# Patient Record
Sex: FEMALE | Race: BLACK OR AFRICAN AMERICAN | NOT HISPANIC OR LATINO | URBAN - METROPOLITAN AREA
[De-identification: names, ages, dates, MRNs, and addresses within clinical notes are randomized per-mention and may not be internally consistent; named-entity substitution may affect disease eponyms.]

---

## 2020-07-06 ENCOUNTER — OUTPATIENT (OUTPATIENT)
Dept: OUTPATIENT SERVICES | Facility: HOSPITAL | Age: 25
LOS: 1 days | Discharge: HOME | End: 2020-07-06

## 2020-07-07 DIAGNOSIS — F39 UNSPECIFIED MOOD [AFFECTIVE] DISORDER: ICD-10-CM

## 2022-03-13 PROBLEM — Z00.00 ENCOUNTER FOR PREVENTIVE HEALTH EXAMINATION: Status: ACTIVE | Noted: 2022-03-13

## 2022-03-15 ENCOUNTER — APPOINTMENT (OUTPATIENT)
Dept: CARDIOLOGY | Facility: CLINIC | Age: 27
End: 2022-03-15
Payer: COMMERCIAL

## 2022-03-15 ENCOUNTER — NON-APPOINTMENT (OUTPATIENT)
Age: 27
End: 2022-03-15

## 2022-03-15 VITALS
OXYGEN SATURATION: 96 % | BODY MASS INDEX: 26.05 KG/M2 | WEIGHT: 182 LBS | SYSTOLIC BLOOD PRESSURE: 118 MMHG | HEART RATE: 93 BPM | DIASTOLIC BLOOD PRESSURE: 78 MMHG | HEIGHT: 70 IN

## 2022-03-15 PROCEDURE — 93000 ELECTROCARDIOGRAM COMPLETE: CPT

## 2022-03-15 PROCEDURE — 99204 OFFICE O/P NEW MOD 45 MIN: CPT

## 2022-03-15 RX ORDER — QUETIAPINE FUMARATE 200 MG/1
200 TABLET ORAL
Refills: 0 | Status: ACTIVE | COMMUNITY

## 2022-03-15 RX ORDER — LANSOPRAZOLE 30 MG/1
30 CAPSULE, DELAYED RELEASE ORAL
Refills: 0 | Status: ACTIVE | COMMUNITY

## 2022-03-15 NOTE — REASON FOR VISIT
[FreeTextEntry1] : Ashley Hernandez 26 years old here for evaluation of palpitations and rapid heartbeat.  She recently had COVID-19

## 2022-03-15 NOTE — ASSESSMENT
[FreeTextEntry1] : Ashley Hernandez is a sinus tachycardia otherwise normal EKG for age.  She does not have findings suggestive of POTS as her heart rate does not increase dramatically with standing and she had no significant dizziness or orthostatic hypotension.  The symptoms of tachycardia seem to have exacerbated after COVID-19.\par \par Certainly there is the possibility of an occult myocarditis though I doubt this based upon her physical exam and EKG.

## 2022-03-15 NOTE — HISTORY OF PRESENT ILLNESS
[FreeTextEntry1] : Ashley is an overall healthy 26-year-old no diabetes or hypertension non-smoker.  She has a longstanding history of anxiety disorder followed by psychiatry on several medications.\par \par She recently had COVID-19 and subsequent to that had severe fatigue mild shortness of breath and is noted that her heart rate has increased significantly.  It is elevated both at rest and increases even further with any activity.  She complains of palpitations and some dizziness.\par \par She also recently started on Adderall in addition to her other medications for difficulty concentrating.  She claims that her tachycardia preceded starting the Adderall

## 2022-03-15 NOTE — DISCUSSION/SUMMARY
[FreeTextEntry1] : 1.  I tried to reassure her that her cardiac status was stable and I doubted that there was any cardiac issues to explain the tachycardia\par \par 2.  This can be seen sometimes after COVID-19 without the diagnosis of POTS\par \par 3.  Nonetheless I suggested a 2D echo to evaluate for structural heart disease.  Echo has been ordered\par \par I will follow-up with the patient after the echo

## 2022-03-15 NOTE — PHYSICAL EXAM
[Normal Conjunctiva] : normal conjunctiva [No Carotid Bruit] : no carotid bruit [Normal S1, S2] : normal S1, S2 [No Murmur] : no murmur [Clear Lung Fields] : clear lung fields [No Respiratory Distress] : no respiratory distress  [Soft] : abdomen soft [Non Tender] : non-tender [Normal Gait] : normal gait [No Edema] : no edema [Normal DP B/L] : normal DP B/L [de-identified] : No orthostatic hypotension today pulse at rest was 88 and after standing went 110 this even after 10 minutes with exercise heart rate went up to 130 beats a minute

## 2022-04-07 ENCOUNTER — APPOINTMENT (OUTPATIENT)
Dept: CARDIOLOGY | Facility: CLINIC | Age: 27
End: 2022-04-07
Payer: COMMERCIAL

## 2022-04-07 PROCEDURE — 93306 TTE W/DOPPLER COMPLETE: CPT

## 2022-05-18 ENCOUNTER — NON-APPOINTMENT (OUTPATIENT)
Age: 27
End: 2022-05-18

## 2023-01-06 ENCOUNTER — APPOINTMENT (OUTPATIENT)
Dept: CARDIOLOGY | Facility: CLINIC | Age: 28
End: 2023-01-06
Payer: COMMERCIAL

## 2023-01-06 ENCOUNTER — NON-APPOINTMENT (OUTPATIENT)
Age: 28
End: 2023-01-06

## 2023-01-06 VITALS
HEART RATE: 123 BPM | SYSTOLIC BLOOD PRESSURE: 110 MMHG | OXYGEN SATURATION: 98 % | DIASTOLIC BLOOD PRESSURE: 70 MMHG | WEIGHT: 200 LBS

## 2023-01-06 DIAGNOSIS — U07.1 COVID-19: ICD-10-CM

## 2023-01-06 PROCEDURE — 99214 OFFICE O/P EST MOD 30 MIN: CPT | Mod: 25

## 2023-01-06 PROCEDURE — 93000 ELECTROCARDIOGRAM COMPLETE: CPT

## 2023-01-06 NOTE — DISCUSSION/SUMMARY
[FreeTextEntry1] :  1.  Start metoprolol 25 mg twice a day\par  2.  Follow-up with endocrinology concerning her thyroid disease\par  3.  Hematology consult real hypercoagulable state to explain the radiology of the pulmonary embolus\par  4.  Follow-up again in 1 month [EKG obtained to assist in diagnosis and management of assessed problem(s)] : EKG obtained to assist in diagnosis and management of assessed problem(s)

## 2023-01-06 NOTE — HISTORY OF PRESENT ILLNESS
[FreeTextEntry1] : Ashley is an overall healthy 27-year-old no diabetes or hypertension non-smoker.  She has a longstanding history of anxiety disorder followed by psychiatry on several medications.\par \par She recently had COVID-19 and subsequent to that had severe fatigue mild shortness of breath and is noted that her heart rate has increased significantly.  It is elevated both at rest and increases even further with any activity.  She complains of palpitations and some dizziness.\par \par She also recently started on Adderall in addition to her other medications for difficulty concentrating.  She claims that her tachycardia preceded starting the Adderall.\par \par   2D echo July 2022 normal left ventricular function normal ejection fraction no segmental wall motion abnormality normal pulmonary pressure.  Thickened mitral valve with minimal mitral valve prolapse minimal mitral regurgitation\par \par  visit January 6, 2023: The patient was recently hospitalized at [facility] with shortness of breath sudden in onset and was diagnosed with acute pulmonary embolus.  She apparently had a repeat echo which was unremarkable and Dopplers of the lower extremities that did not reveal DVT.  I have no documentation.  The patient was initially on Eliquis 10 twice daily and presently is on 5 twice daily.  She remains quite tachycardic but apparently her thyroid function is also not in order\par \par  presently she denies significant shortness of breath but is aware of a fast heart rate\par \par  EKG reveals a sinus tachycardia otherwise unremarkable and unchanged January 6, 2023\par \par

## 2023-01-06 NOTE — ASSESSMENT
[FreeTextEntry1] : Ashley Hernandez is a sinus tachycardia otherwise normal EKG for age.  She does not have findings suggestive of POTS as her heart rate does not increase dramatically with standing and she had no significant dizziness or orthostatic hypotension.  The symptoms of tachycardia seem to have exacerbated after COVID-19.\par \par  initial evaluation did show normal echocardiogram and she was on low-dose beta-blocker.  She also has thyroid disease and had been on Adderall which she is no longer on\par .\par   She recently had an acute pulmonary embolus etiology not clear she did not have a DVT and hypercoagulable state needs to be excluded.  She remains tachycardic which could be related some degree to of the pulmonary embolus but also could be related to her thyroid disease.\par \par \par \par 1.  Sinus tachycardia\par  2.  Status postacute pulmonary emboli\par .3 Hemodynamically stable

## 2023-01-06 NOTE — PHYSICAL EXAM
[Normal Conjunctiva] : normal conjunctiva [No Carotid Bruit] : no carotid bruit [Normal S1, S2] : normal S1, S2 [No Murmur] : no murmur [Clear Lung Fields] : clear lung fields [No Respiratory Distress] : no respiratory distress  [Soft] : abdomen soft [Non Tender] : non-tender [Normal Gait] : normal gait [No Edema] : no edema [Normal DP B/L] : normal DP B/L [de-identified] :  heart rate 110 at rest anxious but no acute distress [de-identified] :  P2 is normal

## 2023-01-18 ENCOUNTER — APPOINTMENT (OUTPATIENT)
Dept: CARDIOLOGY | Facility: CLINIC | Age: 28
End: 2023-01-18
Payer: COMMERCIAL

## 2023-01-18 VITALS
HEART RATE: 120 BPM | WEIGHT: 200 LBS | SYSTOLIC BLOOD PRESSURE: 123 MMHG | OXYGEN SATURATION: 97 % | DIASTOLIC BLOOD PRESSURE: 60 MMHG

## 2023-01-18 PROCEDURE — 99213 OFFICE O/P EST LOW 20 MIN: CPT

## 2023-01-18 RX ORDER — DEXTROAMPHETAMINE SACCHARATE, AMPHETAMINE ASPARTATE, DEXTROAMPHETAMINE SULFATE, AND AMPHETAMINE SULFATE 2.5; 2.5; 2.5; 2.5 MG/1; MG/1; MG/1; MG/1
10 TABLET ORAL
Refills: 0 | Status: DISCONTINUED | COMMUNITY
End: 2023-01-18

## 2023-01-18 NOTE — HISTORY OF PRESENT ILLNESS
[FreeTextEntry1] : Ashley is an overall healthy 27-year-old no diabetes or hypertension non-smoker.  She has a longstanding history of anxiety disorder followed by psychiatry on several medications.\par \par She recently had COVID-19 and subsequent to that had severe fatigue mild shortness of breath and is noted that her heart rate has increased significantly.  It is elevated both at rest and increases even further with any activity.  She complains of palpitations and some dizziness.\par \par She also recently started on Adderall in addition to her other medications for difficulty concentrating.  She claims that her tachycardia preceded starting the Adderall.\par \par   2D echo July 2022 normal left ventricular function normal ejection fraction no segmental wall motion abnormality normal pulmonary pressure.  Thickened mitral valve with minimal mitral valve prolapse minimal mitral regurgitation\par \par  visit January 6, 2023: The patient was recently hospitalized at [facility] with shortness of breath sudden in onset and was diagnosed with acute pulmonary embolus.  She apparently had a repeat echo which was unremarkable and Dopplers of the lower extremities that did not reveal DVT.  I have no documentation.  The patient was initially on Eliquis 10 twice daily and presently is on 5 twice daily.  She remains quite tachycardic but apparently her thyroid function is also not in order\par \par  presently she denies significant shortness of breath but is aware of a fast heart rate\par \par Visit to January 18, 2023: Patient is still awaiting the work-up for a hypercoagulable state.  She also has a history of hyperthyroidism and is on either Tapazole or methimazole and is followed by endocrinology\par \par Her main complaint continues to be a tachycardia feeling her heart rate racing and chest pain and shortness of breath.  We had increased her metoprolol to 25 twice daily.\par Presently her main complaint is that she is not feeling well and feels her heart racing.\par \par  EKG reveals a sinus tachycardia otherwise unremarkable and unchanged January 6, 2023\par \par

## 2023-01-18 NOTE — PHYSICAL EXAM
[Normal Conjunctiva] : normal conjunctiva [No Carotid Bruit] : no carotid bruit [Normal S1, S2] : normal S1, S2 [No Murmur] : no murmur [Clear Lung Fields] : clear lung fields [No Respiratory Distress] : no respiratory distress  [No Edema] : no edema [Normal DP B/L] : normal DP B/L [de-identified] :  heart rate 120 at rest anxious but no acute distress [de-identified] :  P2 is normal

## 2023-01-18 NOTE — DISCUSSION/SUMMARY
[FreeTextEntry1] : 1.  I increased her metoprolol to 50 mg twice a day which can be increased further and is the right drug particularly if she is hypothyroid as a cause of her tachycardia.  The patient asked about calcium channel blockers but I told her this was not the right choice presently\par \par 2.  Follow-up in 1 month and at that point we will repeat the echo if she is still tachycardic we can increase the beta-blocker further if she can tolerate it

## 2023-01-18 NOTE — ASSESSMENT
[FreeTextEntry1] : Ashley Hernandez is a sinus tachycardia otherwise normal EKG for age.  She does not have findings suggestive of POTS as her heart rate does not increase dramatically with standing and she had no significant dizziness or orthostatic hypotension.  The symptoms of tachycardia seem to have exacerbated after COVID-19.\par \par  initial evaluation did show normal echocardiogram and she was on low-dose beta-blocker.  She also has thyroid disease and had been on Adderall which she is no longer on\par .\par   She recently had an acute pulmonary embolus etiology not clear she did not have a DVT and hypercoagulable state needs to be excluded.  She remains tachycardic which could be related some degree to of the pulmonary embolus but also could be related to her thyroid disease.\par \par \par \par 1.  Sinus tachycardia-this persists and she is symptomatic from the tachycardia.  Etiology related to anxiety and perhaps hyperthyroidism.  I do not feel is related to the pulmonary embolus that she had.\par  2.  Status postacute pulmonary emboli DVT rule out hypercoagulable state\par .3 Hemodynamically stable\par 4.  Hyperthyroidism on Tapazole of methimazole

## 2023-02-15 ENCOUNTER — APPOINTMENT (OUTPATIENT)
Dept: CARDIOLOGY | Facility: CLINIC | Age: 28
End: 2023-02-15

## 2023-06-07 ENCOUNTER — NON-APPOINTMENT (OUTPATIENT)
Age: 28
End: 2023-06-07

## 2023-06-07 ENCOUNTER — APPOINTMENT (OUTPATIENT)
Dept: CARDIOLOGY | Facility: CLINIC | Age: 28
End: 2023-06-07
Payer: COMMERCIAL

## 2023-06-07 VITALS
SYSTOLIC BLOOD PRESSURE: 130 MMHG | OXYGEN SATURATION: 97 % | HEART RATE: 88 BPM | WEIGHT: 2 LBS | DIASTOLIC BLOOD PRESSURE: 70 MMHG

## 2023-06-07 DIAGNOSIS — F41.9 ANXIETY DISORDER, UNSPECIFIED: ICD-10-CM

## 2023-06-07 DIAGNOSIS — I31.39 OTHER PERICARDIAL EFFUSION (NONINFLAMMATORY): ICD-10-CM

## 2023-06-07 DIAGNOSIS — I26.99 OTHER PULMONARY EMBOLISM W/OUT ACUTE COR PULMONALE: ICD-10-CM

## 2023-06-07 DIAGNOSIS — E05.90 THYROTOXICOSIS, UNSPECIFIED W/OUT THYROTOXIC CRISIS OR STORM: ICD-10-CM

## 2023-06-07 PROCEDURE — 93306 TTE W/DOPPLER COMPLETE: CPT

## 2023-06-07 PROCEDURE — 99214 OFFICE O/P EST MOD 30 MIN: CPT | Mod: 25

## 2023-06-07 PROCEDURE — 93000 ELECTROCARDIOGRAM COMPLETE: CPT

## 2023-06-07 RX ORDER — TRAZODONE HYDROCHLORIDE 100 MG/1
100 TABLET ORAL
Refills: 0 | Status: DISCONTINUED | COMMUNITY
End: 2023-06-07

## 2023-06-07 RX ORDER — BUPROPION HYDROCHLORIDE 300 MG/1
300 TABLET, EXTENDED RELEASE ORAL
Refills: 0 | Status: DISCONTINUED | COMMUNITY
End: 2023-06-07

## 2023-06-07 NOTE — DISCUSSION/SUMMARY
[FreeTextEntry1] : \par 1.  I do not find a cardiac cause for her shortness of breath which actually is chronic\par  2.  A tachycardia appears to be better than it has been in the past\par  3.  She has a small pericardial effusion which I do not think is hemodynamically significant but needs an echocardiogram for definitive look at the pericardium\par \par  I tried to reassure her that there was no cardiac cause for her symptoms.  I await the results of the echocardio [EKG obtained to assist in diagnosis and management of assessed problem(s)] : EKG obtained to assist in diagnosis and management of assessed problem(s)

## 2023-06-07 NOTE — ASSESSMENT
[FreeTextEntry1] : Ashley Hernandez is a sinus tachycardia otherwise normal EKG for age.  She does not have findings suggestive of POTS as her heart rate does not increase dramatically with standing and she had no significant dizziness or orthostatic hypotension.  The symptoms of tachycardia seem to have exacerbated after COVID-19.\par \par  initial evaluation did show normal echocardiogram and she was on low-dose beta-blocker.  She also has thyroid disease and had been on Adderall which she is no longer on\par .\par   She recently had an acute pulmonary embolus etiology not clear she did not have a DVT and hypercoagulable state needs to be excluded.  She remains tachycardic which could be related some degree to of the pulmonary embolus but also could be related to her thyroid disease.\par \par  the patient has continued to have exertional shortness of breath and palpitations though physical exam is normal saturations are normal and her heart rate response is appropriate and she is not as tachycardic on metoprolol 25 twice a day.  CT was reported as showing a small pericardial effusion which prompted the visit today\par \par \par \par 1.  Sinus tachycardia-this persists  but does not appear to be as significant as on previous visits\par 2. Hemodynamically stable no pulses\par 3.  Hyperthyroidism on Tapazole of methimazole\par 4.Small pericardial effusion  CT scan no evidence of tamponade clinically.

## 2023-06-07 NOTE — REASON FOR VISIT
[FreeTextEntry1] : Ashley mcleod Was seen urgently for evaluation of progressive shortness of breath and an abnormal CT scan performed at [facility] which revealed no pulmonary embolus normal lung fields and a small pericardial effusion.  She was referred urgently for evaluation of the pericardial effusion

## 2023-06-07 NOTE — PHYSICAL EXAM
[Normal Conjunctiva] : normal conjunctiva [No Carotid Bruit] : no carotid bruit [Normal S1, S2] : normal S1, S2 [No Murmur] : no murmur [Clear Lung Fields] : clear lung fields [No Respiratory Distress] : no respiratory distress  [No Edema] : no edema [Normal DP B/L] : normal DP B/L [de-identified] :  heart rate is 95 and increase slightly with exercise.  Her oxygen saturation was 96% both at rest and with walking vigorously back-and-forth.  There was no pulses paradoxus [de-identified] :  no neck vein distention [de-identified] :  P2 is normal

## 2023-06-07 NOTE — HISTORY OF PRESENT ILLNESS
[FreeTextEntry1] : Ashley is an overall healthy 28-year-old no diabetes or hypertension non-smoker.  She has a longstanding history of anxiety disorder followed by psychiatry on several medications.\par \par She  had COVID-19 and subsequent to that had severe fatigue mild shortness of breath and is noted that her heart rate has increased significantly.  It is elevated both at rest and increases even further with any activity.  She complains of palpitations and some dizziness.\par \par She also recently started on Adderall in addition to her other medications for difficulty concentrating.  She claims that her tachycardia preceded starting the Adderall.\par \par   2D echo July 2022 normal left ventricular function normal ejection fraction no segmental wall motion abnormality normal pulmonary pressure.  Thickened mitral valve with minimal mitral valve prolapse minimal mitral regurgitation\par \par  visit January 6, 2023: The patient was recently hospitalized at [facility] with shortness of breath sudden in onset and was diagnosed with acute pulmonary embolus.  She apparently had a repeat echo which was unremarkable and Dopplers of the lower extremities that did not reveal DVT.  I have no documentation.  The patient was initially on Eliquis 10 twice daily and presently is on 5 twice daily.  She remains quite tachycardic but apparently her thyroid function is also not in order\par \par  presently she denies significant shortness of breath but is aware of a fast heart rate\par \par Visit to January 18, 2023: Patient is still awaiting the work-up for a hypercoagulable state.  She also has a history of hyperthyroidism and is on either Tapazole or methimazole and is followed by endocrinology\par \par Her main complaint continues to be a tachycardia feeling her heart rate racing and chest pain and shortness of breath.  We had increased her metoprolol to 25 twice daily.\par Presently her main complaint is that she is not feeling well and feels her heart racing.\par \par  EKG reveals a sinus tachycardia otherwise unremarkable and unchanged January 6, 2023\par \par  visit June 7, 2023: The patient has continued to be short of breath and now feels that she is more short of breath with any kind of exertion.  She also feels her heart racing.  She has been followed by a pulmonologist who ordered a CT of the chest with contrast.  There was no evidence of pulmonary embolus the lung fields were clear and it was read as showing a trace pericardial effusion.  She was referred urgently to see me to see whether the pericardial fusion was playing a role in her shortness of breath\par \par  EKG June 7, 2023 normal sinus rhythm heart rate 90-95 within normal limits no ST elevation no ST depression no CO depression\par \par

## 2023-08-23 ENCOUNTER — EMERGENCY (EMERGENCY)
Facility: HOSPITAL | Age: 28
LOS: 1 days | Discharge: HOME | End: 2023-08-23
Attending: STUDENT IN AN ORGANIZED HEALTH CARE EDUCATION/TRAINING PROGRAM | Admitting: STUDENT IN AN ORGANIZED HEALTH CARE EDUCATION/TRAINING PROGRAM
Payer: COMMERCIAL

## 2023-08-23 VITALS
SYSTOLIC BLOOD PRESSURE: 116 MMHG | RESPIRATION RATE: 20 BRPM | DIASTOLIC BLOOD PRESSURE: 70 MMHG | HEART RATE: 20 BPM | OXYGEN SATURATION: 98 % | TEMPERATURE: 98 F

## 2023-08-23 VITALS
OXYGEN SATURATION: 100 % | SYSTOLIC BLOOD PRESSURE: 131 MMHG | RESPIRATION RATE: 17 BRPM | HEART RATE: 68 BPM | DIASTOLIC BLOOD PRESSURE: 73 MMHG | TEMPERATURE: 99 F

## 2023-08-23 LAB
ALBUMIN SERPL ELPH-MCNC: 4.7 G/DL — SIGNIFICANT CHANGE UP (ref 3.3–5)
ALP SERPL-CCNC: 233 U/L — HIGH (ref 40–120)
ALT FLD-CCNC: 27 U/L — SIGNIFICANT CHANGE UP (ref 4–33)
ANION GAP SERPL CALC-SCNC: 13 MMOL/L — SIGNIFICANT CHANGE UP (ref 7–14)
APTT BLD: 28.1 SEC — SIGNIFICANT CHANGE UP (ref 24.5–35.6)
AST SERPL-CCNC: 22 U/L — SIGNIFICANT CHANGE UP (ref 4–32)
BASOPHILS # BLD AUTO: 0.03 K/UL — SIGNIFICANT CHANGE UP (ref 0–0.2)
BASOPHILS NFR BLD AUTO: 0.3 % — SIGNIFICANT CHANGE UP (ref 0–2)
BILIRUB SERPL-MCNC: 0.3 MG/DL — SIGNIFICANT CHANGE UP (ref 0.2–1.2)
BUN SERPL-MCNC: 8 MG/DL — SIGNIFICANT CHANGE UP (ref 7–23)
CALCIUM SERPL-MCNC: 9.7 MG/DL — SIGNIFICANT CHANGE UP (ref 8.4–10.5)
CHLORIDE SERPL-SCNC: 103 MMOL/L — SIGNIFICANT CHANGE UP (ref 98–107)
CO2 SERPL-SCNC: 23 MMOL/L — SIGNIFICANT CHANGE UP (ref 22–31)
CREAT SERPL-MCNC: 0.81 MG/DL — SIGNIFICANT CHANGE UP (ref 0.5–1.3)
EGFR: 103 ML/MIN/1.73M2 — SIGNIFICANT CHANGE UP
EOSINOPHIL # BLD AUTO: 0.12 K/UL — SIGNIFICANT CHANGE UP (ref 0–0.5)
EOSINOPHIL NFR BLD AUTO: 1.3 % — SIGNIFICANT CHANGE UP (ref 0–6)
GLUCOSE SERPL-MCNC: 76 MG/DL — SIGNIFICANT CHANGE UP (ref 70–99)
HCT VFR BLD CALC: 46 % — HIGH (ref 34.5–45)
HGB BLD-MCNC: 14.9 G/DL — SIGNIFICANT CHANGE UP (ref 11.5–15.5)
IANC: 4.15 K/UL — SIGNIFICANT CHANGE UP (ref 1.8–7.4)
IMM GRANULOCYTES NFR BLD AUTO: 0.2 % — SIGNIFICANT CHANGE UP (ref 0–0.9)
INR BLD: 1 RATIO — SIGNIFICANT CHANGE UP (ref 0.85–1.18)
LYMPHOCYTES # BLD AUTO: 4.5 K/UL — HIGH (ref 1–3.3)
LYMPHOCYTES # BLD AUTO: 47.9 % — HIGH (ref 13–44)
MCHC RBC-ENTMCNC: 28.9 PG — SIGNIFICANT CHANGE UP (ref 27–34)
MCHC RBC-ENTMCNC: 32.4 GM/DL — SIGNIFICANT CHANGE UP (ref 32–36)
MCV RBC AUTO: 89.3 FL — SIGNIFICANT CHANGE UP (ref 80–100)
MONOCYTES # BLD AUTO: 0.57 K/UL — SIGNIFICANT CHANGE UP (ref 0–0.9)
MONOCYTES NFR BLD AUTO: 6.1 % — SIGNIFICANT CHANGE UP (ref 2–14)
NEUTROPHILS # BLD AUTO: 4.15 K/UL — SIGNIFICANT CHANGE UP (ref 1.8–7.4)
NEUTROPHILS NFR BLD AUTO: 44.2 % — SIGNIFICANT CHANGE UP (ref 43–77)
NRBC # BLD: 0 /100 WBCS — SIGNIFICANT CHANGE UP (ref 0–0)
NRBC # FLD: 0 K/UL — SIGNIFICANT CHANGE UP (ref 0–0)
PLATELET # BLD AUTO: 269 K/UL — SIGNIFICANT CHANGE UP (ref 150–400)
POTASSIUM SERPL-MCNC: 4.3 MMOL/L — SIGNIFICANT CHANGE UP (ref 3.5–5.3)
POTASSIUM SERPL-SCNC: 4.3 MMOL/L — SIGNIFICANT CHANGE UP (ref 3.5–5.3)
PROT SERPL-MCNC: 7.5 G/DL — SIGNIFICANT CHANGE UP (ref 6–8.3)
PROTHROM AB SERPL-ACNC: 11.2 SEC — SIGNIFICANT CHANGE UP (ref 9.5–13)
RBC # BLD: 5.15 M/UL — SIGNIFICANT CHANGE UP (ref 3.8–5.2)
RBC # FLD: 13.2 % — SIGNIFICANT CHANGE UP (ref 10.3–14.5)
SODIUM SERPL-SCNC: 139 MMOL/L — SIGNIFICANT CHANGE UP (ref 135–145)
TROPONIN T, HIGH SENSITIVITY RESULT: <6 NG/L — SIGNIFICANT CHANGE UP
WBC # BLD: 9.39 K/UL — SIGNIFICANT CHANGE UP (ref 3.8–10.5)
WBC # FLD AUTO: 9.39 K/UL — SIGNIFICANT CHANGE UP (ref 3.8–10.5)

## 2023-08-23 PROCEDURE — 71275 CT ANGIOGRAPHY CHEST: CPT | Mod: 26,MA

## 2023-08-23 PROCEDURE — 99285 EMERGENCY DEPT VISIT HI MDM: CPT

## 2023-08-23 PROCEDURE — 93010 ELECTROCARDIOGRAM REPORT: CPT

## 2023-08-23 NOTE — ED PROVIDER NOTE - ATTENDING APP SHARED VISIT CONTRIBUTION OF CARE
I have personally performed a face to face medical and diagnostic evaluation of the patient. I have discussed with and reviewed the TOMÁS's note and agree with the History, ROS, Physical Exam and MDM unless otherwise indicated. A brief summary of my personal evaluation and impression can be found below.    Sloan GARCIA: Please see the HPI, ROS, PE and MDM as authored by me.

## 2023-08-23 NOTE — ED ADULT NURSE NOTE - NSFALLUNIVINTERV_ED_ALL_ED
Bed/Stretcher in lowest position, wheels locked, appropriate side rails in place/Call bell, personal items and telephone in reach/Instruct patient to call for assistance before getting out of bed/chair/stretcher/Non-slip footwear applied when patient is off stretcher/[place] to call system/Physically safe environment - no spills, clutter or unnecessary equipment/Purposeful proactive rounding/Room/bathroom lighting operational, light cord in reach

## 2023-08-23 NOTE — ED PROVIDER NOTE - OBJECTIVE STATEMENT
Sloan GARCIA: 28-year-old female, history of prior PEs thought secondary to birth control, currently off AC since June was formally on Eliquis, also has a history of hypothyroidism tachycardia, presents with a chief complaint of worsening shortness of breath and dyspnea on exertion over the last week now associated with diffuse but worsening left-sided chest tightness this feels consistent with her prior episodes of PE, she was seen by her outpatient pulmonologist and was encouraged to come to the ED for evaluation for a CT of her chest, no nausea no vomiting no fever, no urinary or bowel complaints, no new lower extremity swelling or edema, no cough no hemoptysis.  She reports she may have a history of exercise-induced asthma however she does not feel this is contributing to her symptoms.

## 2023-08-23 NOTE — ED PROVIDER NOTE - CLINICAL SUMMARY MEDICAL DECISION MAKING FREE TEXT BOX
Sloan GARCIA: Exam vital signs stable nontoxic-appearing physical exam as above, DDx concern for possible PE given age and risk factors and history, less concern for primary ACS, her EKG is reassuring, will check basic labs cardiac enzymes will get CTA, will give additional meds as needed and reassess, low concern for DVT this time as there is no appreciable lower extremity swelling.

## 2023-08-23 NOTE — ED ADULT NURSE REASSESSMENT NOTE - NS ED NURSE REASSESS COMMENT FT1
Patient A&Ox4, respirations even and unlabored, vitals stable. Pt normal sinus on cardiac monitor. Pt states slight improvement in condition. Awaiting CT scan.

## 2023-08-23 NOTE — ED ADULT TRIAGE NOTE - CHIEF COMPLAINT QUOTE
states" I am having SOB for few weeks and getting worst with chest pain since few days" h/o PE , patient came off anticoagulants in June of this year., h/o Graves disease  takes [id number] daily ,Rexulti, Doxepin, Lansoprazole  Glycopyrrolate 2mg, daily

## 2023-08-23 NOTE — ED ADULT NURSE NOTE - OBJECTIVE STATEMENT
Pt received to int 3 , awake and alert, A&OX4, ambulatory. C/o chest pain and VAUGHN x1 week. States the SOB has become more significant today. HX of PEs. Recently stopped taking Eliquis and no longer taking oral contraceptives. Sent to ED by pulmonologist. Respirations even and unlabored. Denies CP, SOB, N/V, HA, dizziness, palpitations, blurry vision. 20G IV placed to  R lower forearm. Bed in lowest position, call bell within reach. Safety maintained.

## 2023-08-23 NOTE — ED ADULT NURSE NOTE - CHIEF COMPLAINT QUOTE
states" I am having SOB for few weeks and getting worst with chest pain since few days" h/o PE , patient came off anticoagulants in June of this year., h/o Graves disease  takes [id number] daily ,Rexulti, Doxepin, Lansoprazole  Glycopyrrolate 2mg, daily

## 2023-08-23 NOTE — ED PROVIDER NOTE - NSFOLLOWUPINSTRUCTIONS_ED_ALL_ED_FT
FOLLOW UP WITH CARDIOLOGIST AND/ OR YOUR PRIMARY MEDICAL DOCTOR IN 2-3 DAYS OR WITHIN THE WEEK. SHOW COPIES OF YOUR RESULTS/REPORTS GIVEN TO YOU.     Take all of your other medications as previously prescribed.     Worsening or continued chest pain, shortness of breath, weakness, return to ED.

## 2023-08-23 NOTE — ED PROVIDER NOTE - PATIENT PORTAL LINK FT
You can access the FollowMyHealth Patient Portal offered by [facility] by registering at the following website: [email or web address]. By joining [employer]’s FollowMyHealth portal, you will also be able to view your health information using other applications (apps) compatible with our system.

## 2023-08-23 NOTE — ED PROVIDER NOTE - NS ED ATTENDING STATEMENT MOD
This was a shared visit with the TOMÁS. I reviewed and verified the documentation and independently performed the documented:

## 2023-08-23 NOTE — ED PROVIDER NOTE - PHYSICAL EXAMINATION
Sloan GARCIA:  VITALS: Initial triage and subsequent vitals have been reviewed by me.  GEN APPEARANCE: Alert, cooperative. Non-toxic appearing. Well appearing. NAD.  HEAD: Atraumatic, normocephalic   EYES: PERRLa, EOMI, vision grossly intact.   EARS: Gross hearing intact.   NOSE: No nasal discharge, no external evidence of epistaxis.   NECK: Supple  CV: RRR, S1S2, no c/r/m/g. No cyanosis. Extremities warm, well perfused. Cap refill <2 seconds. No bruits.   LUNGS: CTAB. No wheezing. No rales. No rhonchi. No diminished breath sounds.   ABDOMEN: Soft, NTND. No guarding or rebound. No masses.   MSK/EXT: Spine appears normal, no spine point tenderness. No CVA ttp. Normal muscular development. Pelvis stable. No obvious joint or bony deformity, no peripheral edema.   NEURO: Alert, follows commands. Weight bearing normal. Speech normal. Sensation and motor normal x4 extremities.   SKIN: Normal color for race, warm, dry and intact. No evidence of rash.  PSYCH: Normal mood and affect.

## 2023-08-23 NOTE — ED PROVIDER NOTE - PROGRESS NOTE DETAILS
GAURANG Galvan: patient signed out to me follow up CT for PE, no PE, patient is clinically well and vitally stable for dc

## 2023-10-03 ENCOUNTER — APPOINTMENT (OUTPATIENT)
Dept: CARDIOLOGY | Facility: CLINIC | Age: 28
End: 2023-10-03

## 2023-10-14 ENCOUNTER — EMERGENCY (EMERGENCY)
Facility: HOSPITAL | Age: 28
LOS: 1 days | Discharge: HOME | End: 2023-10-14
Attending: STUDENT IN AN ORGANIZED HEALTH CARE EDUCATION/TRAINING PROGRAM
Payer: COMMERCIAL

## 2023-10-14 VITALS
DIASTOLIC BLOOD PRESSURE: 65 MMHG | SYSTOLIC BLOOD PRESSURE: 93 MMHG | HEIGHT: 69 IN | WEIGHT: 216.93 LBS | OXYGEN SATURATION: 99 % | HEART RATE: 95 BPM | RESPIRATION RATE: 22 BRPM | TEMPERATURE: 98 F

## 2023-10-14 VITALS
OXYGEN SATURATION: 98 % | TEMPERATURE: 98 F | RESPIRATION RATE: 16 BRPM | DIASTOLIC BLOOD PRESSURE: 67 MMHG | HEART RATE: 82 BPM | SYSTOLIC BLOOD PRESSURE: 104 MMHG

## 2023-10-14 LAB
ALBUMIN SERPL ELPH-MCNC: 4 G/DL — SIGNIFICANT CHANGE UP (ref 3.3–5)
ALP SERPL-CCNC: 165 U/L — HIGH (ref 40–120)
ALT FLD-CCNC: 23 U/L — SIGNIFICANT CHANGE UP (ref 10–45)
ANION GAP SERPL CALC-SCNC: 12 MMOL/L — SIGNIFICANT CHANGE UP (ref 5–17)
APTT BLD: 27.4 SEC — SIGNIFICANT CHANGE UP (ref 24.5–35.6)
AST SERPL-CCNC: 14 U/L — SIGNIFICANT CHANGE UP (ref 10–40)
BILIRUB SERPL-MCNC: 0.4 MG/DL — SIGNIFICANT CHANGE UP (ref 0.2–1.2)
BLD GP AB SCN SERPL QL: NEGATIVE — SIGNIFICANT CHANGE UP
BUN SERPL-MCNC: 10 MG/DL — SIGNIFICANT CHANGE UP (ref 7–23)
CALCIUM SERPL-MCNC: 9.3 MG/DL — SIGNIFICANT CHANGE UP (ref 8.4–10.5)
CHLORIDE SERPL-SCNC: 107 MMOL/L — SIGNIFICANT CHANGE UP (ref 96–108)
CO2 SERPL-SCNC: 21 MMOL/L — LOW (ref 22–31)
CREAT SERPL-MCNC: 0.77 MG/DL — SIGNIFICANT CHANGE UP (ref 0.5–1.3)
EGFR: 109 ML/MIN/1.73M2 — SIGNIFICANT CHANGE UP
GLUCOSE SERPL-MCNC: 103 MG/DL — HIGH (ref 70–99)
HCG SERPL-ACNC: <2 MIU/ML — SIGNIFICANT CHANGE UP
HCT VFR BLD CALC: 40.5 % — SIGNIFICANT CHANGE UP (ref 34.5–45)
HGB BLD-MCNC: 13.6 G/DL — SIGNIFICANT CHANGE UP (ref 11.5–15.5)
INR BLD: 1.12 RATIO — SIGNIFICANT CHANGE UP (ref 0.85–1.18)
MCHC RBC-ENTMCNC: 30.4 PG — SIGNIFICANT CHANGE UP (ref 27–34)
MCHC RBC-ENTMCNC: 33.6 GM/DL — SIGNIFICANT CHANGE UP (ref 32–36)
MCV RBC AUTO: 90.6 FL — SIGNIFICANT CHANGE UP (ref 80–100)
NRBC # BLD: 0 /100 WBCS — SIGNIFICANT CHANGE UP (ref 0–0)
PLATELET # BLD AUTO: 203 K/UL — SIGNIFICANT CHANGE UP (ref 150–400)
POTASSIUM SERPL-MCNC: 3.9 MMOL/L — SIGNIFICANT CHANGE UP (ref 3.5–5.3)
POTASSIUM SERPL-SCNC: 3.9 MMOL/L — SIGNIFICANT CHANGE UP (ref 3.5–5.3)
PROT SERPL-MCNC: 6.6 G/DL — SIGNIFICANT CHANGE UP (ref 6–8.3)
PROTHROM AB SERPL-ACNC: 12.3 SEC — SIGNIFICANT CHANGE UP (ref 9.5–13)
RBC # BLD: 4.47 M/UL — SIGNIFICANT CHANGE UP (ref 3.8–5.2)
RBC # FLD: 12.4 % — SIGNIFICANT CHANGE UP (ref 10.3–14.5)
RH IG SCN BLD-IMP: POSITIVE — SIGNIFICANT CHANGE UP
SODIUM SERPL-SCNC: 140 MMOL/L — SIGNIFICANT CHANGE UP (ref 135–145)
WBC # BLD: 9.77 K/UL — SIGNIFICANT CHANGE UP (ref 3.8–10.5)
WBC # FLD AUTO: 9.77 K/UL — SIGNIFICANT CHANGE UP (ref 3.8–10.5)

## 2023-10-14 PROCEDURE — 85027 COMPLETE CBC AUTOMATED: CPT

## 2023-10-14 PROCEDURE — 86900 BLOOD TYPING SEROLOGIC ABO: CPT

## 2023-10-14 PROCEDURE — 96374 THER/PROPH/DIAG INJ IV PUSH: CPT | Mod: XU

## 2023-10-14 PROCEDURE — 85730 THROMBOPLASTIN TIME PARTIAL: CPT

## 2023-10-14 PROCEDURE — 84702 CHORIONIC GONADOTROPIN TEST: CPT

## 2023-10-14 PROCEDURE — 86901 BLOOD TYPING SEROLOGIC RH(D): CPT

## 2023-10-14 PROCEDURE — 80053 COMPREHEN METABOLIC PANEL: CPT

## 2023-10-14 PROCEDURE — 99285 EMERGENCY DEPT VISIT HI MDM: CPT

## 2023-10-14 PROCEDURE — 85610 PROTHROMBIN TIME: CPT

## 2023-10-14 PROCEDURE — 99284 EMERGENCY DEPT VISIT MOD MDM: CPT | Mod: 25

## 2023-10-14 PROCEDURE — 72193 CT PELVIS W/DYE: CPT | Mod: MA

## 2023-10-14 PROCEDURE — 86850 RBC ANTIBODY SCREEN: CPT

## 2023-10-14 PROCEDURE — 72193 CT PELVIS W/DYE: CPT | Mod: 26,MA

## 2023-10-14 RX ORDER — SODIUM CHLORIDE 9 MG/ML
1000 INJECTION INTRAMUSCULAR; INTRAVENOUS; SUBCUTANEOUS ONCE
Refills: 0 | Status: COMPLETED | OUTPATIENT
Start: 2023-10-14 | End: 2023-10-14

## 2023-10-14 RX ORDER — FENTANYL CITRATE 50 UG/ML
25 INJECTION INTRAVENOUS ONCE
Refills: 0 | Status: DISCONTINUED | OUTPATIENT
Start: 2023-10-14 | End: 2023-10-14

## 2023-10-14 RX ORDER — OXYCODONE AND ACETAMINOPHEN 5; 325 MG/1; MG/1
2 TABLET ORAL ONCE
Refills: 0 | Status: DISCONTINUED | OUTPATIENT
Start: 2023-10-14 | End: 2023-10-14

## 2023-10-14 RX ADMIN — Medication 300 MILLIGRAM(S): at 06:27

## 2023-10-14 RX ADMIN — FENTANYL CITRATE 25 MICROGRAM(S): 50 INJECTION INTRAVENOUS at 05:38

## 2023-10-14 RX ADMIN — SODIUM CHLORIDE 1000 MILLILITER(S): 9 INJECTION INTRAMUSCULAR; INTRAVENOUS; SUBCUTANEOUS at 05:36

## 2023-10-14 NOTE — ED PROVIDER NOTE - ATTENDING CONTRIBUTION TO CARE
I, Cleveland Kidd, performed a history and physical exam of the patient and discussed their management with the resident and/or advanced care provider. I reviewed the resident and/or advanced care provider's note and agree with the documented findings and plan of care. I was present and available for all procedures.    Patient presenting with left-sided buttock swelling for 1 day causing pain denies fevers chills rectal complaints discharge bug bites trauma to area    Gen: Well appearing and in NAD  Head: normal appearing atraumatic   Neck: trachea midline  Resp:  No respiratory distress  Abd; soft NT ND  Ext: no visible deformities  L sided buttocks swelling induration ttp no crepitus no drainage   Neuro:  Alert and oriented, appears non focal  Skin:  Warm and dry as visualized  Psych:  Normal affect and mood    Patient presenting with significant cellulitis likely abscess with pocket needing drainage otherwise no systemic signs and symptoms we will send screening labs otherwise CT scan evaluate depth possible perianal perirectal abscess fistula needs drainage antibiotics discussed patient agreeable plan disposition pending work-up and reevaluation possible surgical consultation

## 2023-10-14 NOTE — ED PROVIDER NOTE - PHYSICAL EXAMINATION
GENERAL: well appearing in no acute distress, non-toxic appearing  CARDIAC: regular rate and rhythm, normal S1S2, no appreciable murmurs, 2+ pulses in UE/LE b/l  PULM: normal breath sounds, clear to ascultation bilaterally, no rales, rhonchi, wheezing  GI: abdomen nondistended, soft, nontender, no guarding, rebound tenderness  : no suprapubic tenderness  SKIN: palpable mass to intergluteal cleft on the L no overlying skin changes GENERAL: well appearing in no acute distress, non-toxic appearing  CARDIAC: regular rate and rhythm, normal S1S2, no appreciable murmurs, 2+ pulses in UE/LE b/l  PULM: normal breath sounds, clear to ascultation bilaterally, no rales, rhonchi, wheezing  GI: abdomen nondistended, soft, nontender, no guarding, rebound tenderness  : no suprapubic tenderness  SKIN: palpable mass to perirectal area with surrounding erythema and induration, no drainage

## 2023-10-14 NOTE — ED PROVIDER NOTE - PATIENT PORTAL LINK FT
You can access the FollowMyHealth Patient Portal offered by [facility] by registering at the following website: [email or web address]. By joining [employer]’s FollowMyHealth portal, you will also be able to view your health information using other applications (apps) compatible with our system. You can access the FollowMyHealth Patient Portal offered by [facility] by registering at the following website: [email or web address]. By joining [employer]’s FollowMyHealth portal, you will also be able to view your health information using other applications (apps) compatible with our system. You can access the FollowMyHealth Patient Portal offered by [facility] by registering at the following website: [email or web address]. By joining [employer]’s FollowMyHealth portal, you will also be able to view your health information using other applications (apps) compatible with our system.

## 2023-10-14 NOTE — ED PROVIDER NOTE - CLINICAL SUMMARY MEDICAL DECISION MAKING FREE TEXT BOX
CT to eval for fistula, likely pilonidal cyst, will pain control and refer to surgery CT to eval fistula, perirectal abscess w surrounding erythema CT to eval fistula, perirectal abscess w surrounding erythema    pettet attending- see attending attestation for my mdm

## 2023-10-14 NOTE — ED ADULT NURSE NOTE - NSFALLUNIVINTERV_ED_ALL_ED
Bed/Stretcher in lowest position, wheels locked, appropriate side rails in place/Call bell, personal items and telephone in reach/[place] to call system/Physically safe environment - no spills, clutter or unnecessary equipment/Purposeful proactive rounding/Room/bathroom lighting operational, light cord in reach Bed/Stretcher in lowest position, wheels locked, appropriate side rails in place/Call bell, personal items and telephone in reach/[place] to call system/Physically safe environment - no spills, clutter or unnecessary equipment/Purposeful proactive rounding/Room/bathroom lighting operational, light cord in reach Bed/Stretcher in lowest position, wheels locked, appropriate side rails in place/Call bell, personal items and telephone in reach/[place] to call system/Physically safe environment - no spills, clutter or unnecessary equipment/Purposeful proactive rounding/Room/bathroom lighting operational, light cord in reach

## 2023-10-14 NOTE — ED ADULT NURSE REASSESSMENT NOTE - NS ED NURSE REASSESS COMMENT FT1
Report received from DAISY Pereira. Pt A&Ox4, breathing spontaneously and unlabored on room air, moving all extremities easily, is ambulatory. Pt stable for DC per MD Nettles.

## 2023-10-14 NOTE — ED ADULT NURSE NOTE - SUICIDE SCREENING QUESTION 2
Problem: Communication  Goal: The ability to communicate needs accurately and effectively will improve  Outcome: PROGRESSING AS EXPECTED  Verbalized plan of care and educated pt on medications. Pt has no further questions at this time.    Problem: Safety  Goal: Will remain free from injury  Outcome: PROGRESSING AS EXPECTED  Bedside table and call light are within reach. Bed is locked and in the lowest position. Treaded socks are on. Patient educated to call for assistance       No

## 2023-10-14 NOTE — ED PROVIDER NOTE - OBJECTIVE STATEMENT
29 yo f hx of recent thyroidectomy here for one week of cyst on tailbone. patient says this started about a week ago with a small cyst which has grown in size and caused significant pain. has never had this before, but does have a family history of pilonidal cyst. denies fevers, chills, abdominal pain, diarrhea, dysuria.

## 2023-10-14 NOTE — ED ADULT NURSE NOTE - OBJECTIVE STATEMENT
29 y/o F with PMH of Graves disease and thyroid removal less than a month ago. Pt presents to the ED c/o cyst on tailbone since Monday. Pt reports cyst has become increasingly "red, swollen, hard to touch and painful." Pt reports pain travels down both legs. Pt reports taking Ibuprofen for pain with mild relief in pain yesterday; reports no relief in pain today; last dose at 1 am. Denies SOB, n/v/d, dizziness, fever, chills. Patient safety and comfort measures implemented.

## 2023-10-14 NOTE — ED PROVIDER NOTE - NSFOLLOWUPINSTRUCTIONS_ED_ALL_ED_FT
1. TAKE ALL MEDICATIONS AS DIRECTED.    2. FOR PAIN OR FEVER YOU CAN TAKE IBUPROFEN (MOTRIN, ADVIL) OR ACETAMINOPHEN (TYLENOL) AS NEEDED, AS DIRECTED ON PACKAGING.  3. FOLLOW UP WITH YOUR PRIMARY DOCTOR WITHIN 5 DAYS AS DIRECTED.  4. IF YOU HAD LABS OR IMAGING DONE, YOU WERE GIVEN COPIES OF ALL LABS AND/OR IMAGING RESULTS FROM YOUR ER VISIT--PLEASE TAKE THEM WITH YOU TO YOUR FOLLOW UP APPOINTMENTS.  5. RETURN TO THE ER FOR ANY WORSENING SYMPTOMS OR CONCERNS.    Abscess    An abscess is an infected area that contains a collection of pus and debris. It can occur in almost any part of the body and occurs when the tissue gets infection. Symptoms include a painful mass that is red, warm, tender that might break open and HAVE drainage. If your health care provider gave you antibiotics make sure to take the full course and do not stop even if feeling better.     SEEK IMMEDIATE MEDICAL CARE IF YOU HAVE ANY OF THE FOLLOWING SYMPTOMS: chills, fever, muscle aches, or red streaking from the area.    Skin Abscess     A skin abscess is an infected area on or under your skin that contains a collection of pus and other material. An abscess may also be called a furuncle, carbuncle, or boil. An abscess can occur in or on almost any part of your body.    Some abscesses break open (rupture) on their own. Most continue to get worse unless they are treated. The infection can spread deeper into the body and eventually into your blood, which can make you feel ill. Treatment usually involves draining the abscess.    What are the causes?    An abscess occurs when germs, like bacteria, pass through your skin and cause an infection. This may be caused by:  A scrape or cut on your skin.  A puncture wound through your skin, including a needle injection or insect bite .Blocked oil or sweat glands. Blocked and infected hair follicles.   A cyst that forms beneath your skin (sebaceous cyst) and becomes infected.    What increases the risk?  This condition is more likely to develop in people who:  Have a weak body defense system (immune system).Have diabetes.  Have dry and irritated skin.  Get frequent injections or use illegal IV drugs.  Have a foreign body in a wound, such as a splinter.  Have problems with their lymph system or veins.    What are the signs or symptoms?  Symptoms of this condition include:  A painful, firm bump under the skin.A bump with pus at the top. This may break through the skin and drain.    Other symptoms include:  Redness surrounding the abscess site.Warmth.Swelling of the lymph nodes (glands) near the abscess.Tenderness. A sore on the skin.How is this diagnosed?    This condition may be diagnosed based on:  A physical exam.Your medical history.A sample of pus. This may be used to find out what is causing the infection.Blood tests.Imaging tests, such as an ultrasound, CT scan, or MRI. How is this treated?  A small abscess that drains on its own may not need treatment. Treatment for larger abscesses may include:  Moist heat or heat pack applied to the area several times a day.A procedure to drain the abscess (incision and drainage).Antibiotic medicines. For a severe abscess, you may first get antibiotics through an IV and then change to antibiotics by mouth.Follow these instructions at home:    Medicines     Take over-the-counter and prescription medicines only as told by your health care provider.If you were prescribed an antibiotic medicine, take it as told by your health care provider. Do not stop taking the antibiotic even if you start to feel better.Abscess care      If you have an abscess that has not drained, apply heat to the affected area. Use the heat source that your health care provider recommends, such as a moist heat pack or a heating pad.    Place a towel between your skin and the heat source.Leave the heat on for 20–30 minutes.Remove the heat if your skin turns bright red. This is especially important if you are unable to feel pain, heat, or cold. You may have a greater risk of getting burned.Follow instructions from your health care provider about how to take care of your abscess. Make sure you:    Cover the abscess with a bandage (dressing).Change your dressing or gauze as told by your health care provider.Wash your hands with soap and water before you change the dressing or gauze. If soap and water are not available, use hand .Check your abscess every day for signs of a worsening infection. Check for:    More redness, swelling, or pain.More fluid or blood.Warmth.More pus or a bad smell.General instructions     To avoid spreading the infection:  Do not share personal care items, towels, or hot tubs with others.Avoid making skin contact with other people.Keep all follow-up visits as told by your health care provider. This is important.Contact a health care provider if you have:    More redness, swelling, or pain around your abscess.More fluid or blood coming from your abscess.Warm skin around your abscess.More pus or a bad smell coming from your abscess.A fever.Muscle aches.Chills or a general ill feeling.Get help right away if you:    Have severe pain.See red streaks on your skin spreading away from the abscess.Summary  A skin abscess is an infected area on or under your skin that contains a collection of pus and other material.A small abscess that drains on its own may not need treatment.Treatment for larger abscesses may include having a procedure to drain the abscess and taking an antibiotic.This information is not intended to replace advice given to you by your health care provider. Make sure you discuss any questions you have with your health care provider.

## 2023-10-16 ENCOUNTER — APPOINTMENT (OUTPATIENT)
Dept: SURGERY | Facility: CLINIC | Age: 28
End: 2023-10-16
Payer: COMMERCIAL

## 2023-10-16 VITALS
TEMPERATURE: 96.7 F | WEIGHT: 217 LBS | HEIGHT: 69 IN | SYSTOLIC BLOOD PRESSURE: 107 MMHG | DIASTOLIC BLOOD PRESSURE: 70 MMHG | OXYGEN SATURATION: 96 % | BODY MASS INDEX: 32.14 KG/M2 | HEART RATE: 96 BPM | RESPIRATION RATE: 18 BRPM

## 2023-10-16 DIAGNOSIS — Z87.01 PERSONAL HISTORY OF PNEUMONIA (RECURRENT): ICD-10-CM

## 2023-10-16 DIAGNOSIS — Z86.711 PERSONAL HISTORY OF PULMONARY EMBOLISM: ICD-10-CM

## 2023-10-16 DIAGNOSIS — Z78.9 OTHER SPECIFIED HEALTH STATUS: ICD-10-CM

## 2023-10-16 DIAGNOSIS — Z87.09 PERSONAL HISTORY OF OTHER DISEASES OF THE RESPIRATORY SYSTEM: ICD-10-CM

## 2023-10-16 DIAGNOSIS — L05.01 PILONIDAL CYST WITH ABSCESS: ICD-10-CM

## 2023-10-16 DIAGNOSIS — Z97.5 PRESENCE OF (INTRAUTERINE) CONTRACEPTIVE DEVICE: ICD-10-CM

## 2023-10-16 DIAGNOSIS — Z86.39 PERSONAL HISTORY OF OTHER ENDOCRINE, NUTRITIONAL AND METABOLIC DISEASE: ICD-10-CM

## 2023-10-16 DIAGNOSIS — Z87.2 PERSONAL HISTORY OF DISEASES OF THE SKIN AND SUBCUTANEOUS TISSUE: ICD-10-CM

## 2023-10-16 DIAGNOSIS — Z84.0 FAMILY HISTORY OF DISEASES OF THE SKIN AND SUBCUTANEOUS TISSUE: ICD-10-CM

## 2023-10-16 PROCEDURE — 99204 OFFICE O/P NEW MOD 45 MIN: CPT

## 2023-10-16 RX ORDER — SERDEXMETHYLPHENIDATE AND DEXMETHYLPHENIDATE 7.8; 39.2 MG/1; MG/1
39.2-7.8 CAPSULE ORAL
Refills: 0 | Status: ACTIVE | COMMUNITY

## 2023-10-16 RX ORDER — GLYCOPYRROLATE 2 MG/1
2 TABLET ORAL
Refills: 0 | Status: ACTIVE | COMMUNITY

## 2023-10-16 RX ORDER — DULOXETINE HYDROCHLORIDE 20 MG/1
20 CAPSULE, DELAYED RELEASE PELLETS ORAL
Refills: 0 | Status: ACTIVE | COMMUNITY

## 2023-10-16 RX ORDER — DOXEPIN HYDROCHLORIDE 10 MG/1
10 CAPSULE ORAL
Refills: 0 | Status: ACTIVE | COMMUNITY

## 2023-10-16 RX ORDER — BREXPIPRAZOLE 1 MG/1
1 TABLET ORAL
Refills: 0 | Status: ACTIVE | COMMUNITY

## 2023-10-17 ENCOUNTER — APPOINTMENT (OUTPATIENT)
Dept: CARDIOLOGY | Facility: CLINIC | Age: 28
End: 2023-10-17

## 2023-10-30 ENCOUNTER — APPOINTMENT (OUTPATIENT)
Dept: CARDIOLOGY | Facility: CLINIC | Age: 28
End: 2023-10-30

## 2023-10-30 ENCOUNTER — EMERGENCY (EMERGENCY)
Facility: HOSPITAL | Age: 28
LOS: 1 days | Discharge: HOME | End: 2023-10-30
Attending: EMERGENCY MEDICINE | Admitting: STUDENT IN AN ORGANIZED HEALTH CARE EDUCATION/TRAINING PROGRAM
Payer: COMMERCIAL

## 2023-10-30 VITALS
OXYGEN SATURATION: 100 % | DIASTOLIC BLOOD PRESSURE: 74 MMHG | SYSTOLIC BLOOD PRESSURE: 127 MMHG | TEMPERATURE: 98 F | HEART RATE: 99 BPM | RESPIRATION RATE: 17 BRPM

## 2023-10-30 LAB
ALBUMIN SERPL ELPH-MCNC: 4.3 G/DL — SIGNIFICANT CHANGE UP (ref 3.3–5)
ALBUMIN SERPL ELPH-MCNC: 4.3 G/DL — SIGNIFICANT CHANGE UP (ref 3.3–5)
ALP SERPL-CCNC: 166 U/L — HIGH (ref 40–120)
ALP SERPL-CCNC: 166 U/L — HIGH (ref 40–120)
ALT FLD-CCNC: 21 U/L — SIGNIFICANT CHANGE UP (ref 4–33)
ALT FLD-CCNC: 21 U/L — SIGNIFICANT CHANGE UP (ref 4–33)
ANION GAP SERPL CALC-SCNC: 12 MMOL/L — SIGNIFICANT CHANGE UP (ref 7–14)
ANION GAP SERPL CALC-SCNC: 12 MMOL/L — SIGNIFICANT CHANGE UP (ref 7–14)
AST SERPL-CCNC: 21 U/L — SIGNIFICANT CHANGE UP (ref 4–32)
AST SERPL-CCNC: 21 U/L — SIGNIFICANT CHANGE UP (ref 4–32)
BASOPHILS # BLD AUTO: 0.03 K/UL — SIGNIFICANT CHANGE UP (ref 0–0.2)
BASOPHILS # BLD AUTO: 0.03 K/UL — SIGNIFICANT CHANGE UP (ref 0–0.2)
BASOPHILS NFR BLD AUTO: 0.4 % — SIGNIFICANT CHANGE UP (ref 0–2)
BASOPHILS NFR BLD AUTO: 0.4 % — SIGNIFICANT CHANGE UP (ref 0–2)
BILIRUB SERPL-MCNC: 0.4 MG/DL — SIGNIFICANT CHANGE UP (ref 0.2–1.2)
BILIRUB SERPL-MCNC: 0.4 MG/DL — SIGNIFICANT CHANGE UP (ref 0.2–1.2)
BUN SERPL-MCNC: 9 MG/DL — SIGNIFICANT CHANGE UP (ref 7–23)
BUN SERPL-MCNC: 9 MG/DL — SIGNIFICANT CHANGE UP (ref 7–23)
CALCIUM SERPL-MCNC: 9.3 MG/DL — SIGNIFICANT CHANGE UP (ref 8.4–10.5)
CALCIUM SERPL-MCNC: 9.3 MG/DL — SIGNIFICANT CHANGE UP (ref 8.4–10.5)
CHLORIDE SERPL-SCNC: 104 MMOL/L — SIGNIFICANT CHANGE UP (ref 98–107)
CHLORIDE SERPL-SCNC: 104 MMOL/L — SIGNIFICANT CHANGE UP (ref 98–107)
CO2 SERPL-SCNC: 22 MMOL/L — SIGNIFICANT CHANGE UP (ref 22–31)
CO2 SERPL-SCNC: 22 MMOL/L — SIGNIFICANT CHANGE UP (ref 22–31)
CREAT SERPL-MCNC: 0.89 MG/DL — SIGNIFICANT CHANGE UP (ref 0.5–1.3)
CREAT SERPL-MCNC: 0.89 MG/DL — SIGNIFICANT CHANGE UP (ref 0.5–1.3)
EGFR: 92 ML/MIN/1.73M2 — SIGNIFICANT CHANGE UP
EGFR: 92 ML/MIN/1.73M2 — SIGNIFICANT CHANGE UP
EOSINOPHIL # BLD AUTO: 0.07 K/UL — SIGNIFICANT CHANGE UP (ref 0–0.5)
EOSINOPHIL # BLD AUTO: 0.07 K/UL — SIGNIFICANT CHANGE UP (ref 0–0.5)
EOSINOPHIL NFR BLD AUTO: 1 % — SIGNIFICANT CHANGE UP (ref 0–6)
EOSINOPHIL NFR BLD AUTO: 1 % — SIGNIFICANT CHANGE UP (ref 0–6)
GLUCOSE SERPL-MCNC: 90 MG/DL — SIGNIFICANT CHANGE UP (ref 70–99)
GLUCOSE SERPL-MCNC: 90 MG/DL — SIGNIFICANT CHANGE UP (ref 70–99)
HCT VFR BLD CALC: 40.5 % — SIGNIFICANT CHANGE UP (ref 34.5–45)
HCT VFR BLD CALC: 40.5 % — SIGNIFICANT CHANGE UP (ref 34.5–45)
HGB BLD-MCNC: 13.7 G/DL — SIGNIFICANT CHANGE UP (ref 11.5–15.5)
HGB BLD-MCNC: 13.7 G/DL — SIGNIFICANT CHANGE UP (ref 11.5–15.5)
IANC: 3.44 K/UL — SIGNIFICANT CHANGE UP (ref 1.8–7.4)
IANC: 3.44 K/UL — SIGNIFICANT CHANGE UP (ref 1.8–7.4)
IMM GRANULOCYTES NFR BLD AUTO: 0.1 % — SIGNIFICANT CHANGE UP (ref 0–0.9)
IMM GRANULOCYTES NFR BLD AUTO: 0.1 % — SIGNIFICANT CHANGE UP (ref 0–0.9)
LYMPHOCYTES # BLD AUTO: 3.31 K/UL — HIGH (ref 1–3.3)
LYMPHOCYTES # BLD AUTO: 3.31 K/UL — HIGH (ref 1–3.3)
LYMPHOCYTES # BLD AUTO: 45 % — HIGH (ref 13–44)
LYMPHOCYTES # BLD AUTO: 45 % — HIGH (ref 13–44)
MAGNESIUM SERPL-MCNC: 2 MG/DL — SIGNIFICANT CHANGE UP (ref 1.6–2.6)
MAGNESIUM SERPL-MCNC: 2 MG/DL — SIGNIFICANT CHANGE UP (ref 1.6–2.6)
MCHC RBC-ENTMCNC: 30.5 PG — SIGNIFICANT CHANGE UP (ref 27–34)
MCHC RBC-ENTMCNC: 30.5 PG — SIGNIFICANT CHANGE UP (ref 27–34)
MCHC RBC-ENTMCNC: 33.8 GM/DL — SIGNIFICANT CHANGE UP (ref 32–36)
MCHC RBC-ENTMCNC: 33.8 GM/DL — SIGNIFICANT CHANGE UP (ref 32–36)
MCV RBC AUTO: 90.2 FL — SIGNIFICANT CHANGE UP (ref 80–100)
MCV RBC AUTO: 90.2 FL — SIGNIFICANT CHANGE UP (ref 80–100)
MONOCYTES # BLD AUTO: 0.49 K/UL — SIGNIFICANT CHANGE UP (ref 0–0.9)
MONOCYTES # BLD AUTO: 0.49 K/UL — SIGNIFICANT CHANGE UP (ref 0–0.9)
MONOCYTES NFR BLD AUTO: 6.7 % — SIGNIFICANT CHANGE UP (ref 2–14)
MONOCYTES NFR BLD AUTO: 6.7 % — SIGNIFICANT CHANGE UP (ref 2–14)
NEUTROPHILS # BLD AUTO: 3.44 K/UL — SIGNIFICANT CHANGE UP (ref 1.8–7.4)
NEUTROPHILS # BLD AUTO: 3.44 K/UL — SIGNIFICANT CHANGE UP (ref 1.8–7.4)
NEUTROPHILS NFR BLD AUTO: 46.8 % — SIGNIFICANT CHANGE UP (ref 43–77)
NEUTROPHILS NFR BLD AUTO: 46.8 % — SIGNIFICANT CHANGE UP (ref 43–77)
NRBC # BLD: 0 /100 WBCS — SIGNIFICANT CHANGE UP (ref 0–0)
NRBC # BLD: 0 /100 WBCS — SIGNIFICANT CHANGE UP (ref 0–0)
NRBC # FLD: 0 K/UL — SIGNIFICANT CHANGE UP (ref 0–0)
NRBC # FLD: 0 K/UL — SIGNIFICANT CHANGE UP (ref 0–0)
PLATELET # BLD AUTO: 253 K/UL — SIGNIFICANT CHANGE UP (ref 150–400)
PLATELET # BLD AUTO: 253 K/UL — SIGNIFICANT CHANGE UP (ref 150–400)
POTASSIUM SERPL-MCNC: 4.3 MMOL/L — SIGNIFICANT CHANGE UP (ref 3.5–5.3)
POTASSIUM SERPL-MCNC: 4.3 MMOL/L — SIGNIFICANT CHANGE UP (ref 3.5–5.3)
POTASSIUM SERPL-SCNC: 4.3 MMOL/L — SIGNIFICANT CHANGE UP (ref 3.5–5.3)
POTASSIUM SERPL-SCNC: 4.3 MMOL/L — SIGNIFICANT CHANGE UP (ref 3.5–5.3)
PROT SERPL-MCNC: 6.7 G/DL — SIGNIFICANT CHANGE UP (ref 6–8.3)
PROT SERPL-MCNC: 6.7 G/DL — SIGNIFICANT CHANGE UP (ref 6–8.3)
RBC # BLD: 4.49 M/UL — SIGNIFICANT CHANGE UP (ref 3.8–5.2)
RBC # BLD: 4.49 M/UL — SIGNIFICANT CHANGE UP (ref 3.8–5.2)
RBC # FLD: 12.6 % — SIGNIFICANT CHANGE UP (ref 10.3–14.5)
RBC # FLD: 12.6 % — SIGNIFICANT CHANGE UP (ref 10.3–14.5)
SODIUM SERPL-SCNC: 138 MMOL/L — SIGNIFICANT CHANGE UP (ref 135–145)
SODIUM SERPL-SCNC: 138 MMOL/L — SIGNIFICANT CHANGE UP (ref 135–145)
T3 SERPL-MCNC: 70 NG/DL — LOW (ref 80–200)
T3 SERPL-MCNC: 70 NG/DL — LOW (ref 80–200)
T4 AB SER-ACNC: 6.63 UG/DL — SIGNIFICANT CHANGE UP (ref 5.1–13)
T4 AB SER-ACNC: 6.63 UG/DL — SIGNIFICANT CHANGE UP (ref 5.1–13)
T4 FREE SERPL-MCNC: 1.5 NG/DL — SIGNIFICANT CHANGE UP (ref 0.9–1.8)
T4 FREE SERPL-MCNC: 1.5 NG/DL — SIGNIFICANT CHANGE UP (ref 0.9–1.8)
TROPONIN T, HIGH SENSITIVITY RESULT: <6 NG/L — SIGNIFICANT CHANGE UP
TSH SERPL-MCNC: 1.18 UIU/ML — SIGNIFICANT CHANGE UP (ref 0.27–4.2)
TSH SERPL-MCNC: 1.18 UIU/ML — SIGNIFICANT CHANGE UP (ref 0.27–4.2)
WBC # BLD: 7.35 K/UL — SIGNIFICANT CHANGE UP (ref 3.8–10.5)
WBC # BLD: 7.35 K/UL — SIGNIFICANT CHANGE UP (ref 3.8–10.5)
WBC # FLD AUTO: 7.35 K/UL — SIGNIFICANT CHANGE UP (ref 3.8–10.5)
WBC # FLD AUTO: 7.35 K/UL — SIGNIFICANT CHANGE UP (ref 3.8–10.5)

## 2023-10-30 PROCEDURE — 76937 US GUIDE VASCULAR ACCESS: CPT | Mod: 26

## 2023-10-30 PROCEDURE — 71275 CT ANGIOGRAPHY CHEST: CPT | Mod: 26,MD

## 2023-10-30 PROCEDURE — 99223 1ST HOSP IP/OBS HIGH 75: CPT

## 2023-10-30 PROCEDURE — 36000 PLACE NEEDLE IN VEIN: CPT

## 2023-10-30 PROCEDURE — 93010 ELECTROCARDIOGRAM REPORT: CPT

## 2023-10-30 RX ORDER — ACETAMINOPHEN 500 MG
650 TABLET ORAL EVERY 6 HOURS
Refills: 0 | Status: DISCONTINUED | OUTPATIENT
Start: 2023-10-30 | End: 2023-11-03

## 2023-10-30 RX ORDER — ASPIRIN/CALCIUM CARB/MAGNESIUM 324 MG
324 TABLET ORAL ONCE
Refills: 0 | Status: COMPLETED | OUTPATIENT
Start: 2023-10-30 | End: 2023-10-30

## 2023-10-30 RX ORDER — ASPIRIN/CALCIUM CARB/MAGNESIUM 324 MG
81 TABLET ORAL DAILY
Refills: 0 | Status: DISCONTINUED | OUTPATIENT
Start: 2023-10-30 | End: 2023-11-03

## 2023-10-30 RX ORDER — LEVOTHYROXINE SODIUM 125 MCG
137 TABLET ORAL DAILY
Refills: 0 | Status: DISCONTINUED | OUTPATIENT
Start: 2023-10-31 | End: 2023-11-03

## 2023-10-30 RX ORDER — METOPROLOL TARTRATE 50 MG
50 TABLET ORAL
Refills: 0 | Status: DISCONTINUED | OUTPATIENT
Start: 2023-10-30 | End: 2023-11-03

## 2023-10-30 RX ORDER — SODIUM CHLORIDE 9 MG/ML
1000 INJECTION INTRAMUSCULAR; INTRAVENOUS; SUBCUTANEOUS ONCE
Refills: 0 | Status: COMPLETED | OUTPATIENT
Start: 2023-10-30 | End: 2023-10-30

## 2023-10-30 RX ADMIN — Medication 324 MILLIGRAM(S): at 20:40

## 2023-10-30 RX ADMIN — SODIUM CHLORIDE 1000 MILLILITER(S): 9 INJECTION INTRAMUSCULAR; INTRAVENOUS; SUBCUTANEOUS at 16:08

## 2023-10-30 NOTE — ED PROVIDER NOTE - ATTENDING APP SHARED VISIT CONTRIBUTION OF CARE
Attending note:   After face to face evaluation of this patient, I concur with above noted hx, pe, and care plan for this patient.  Benjamin: Patient-year-old female with history of PE 1 year ago with anticoagulation for 6 months.  At that time PE was felt to be possibly due to OCP.  Patient was then switched to a different OCP.  Patient now presents the ED after diagnosis of Graves' disease and thyroidectomy that has been under control.  Patient was supposed to be on metoprolol but has been off of it for 4 days that she is trying to wean herself off and follow-up with cardiology to see if it is really necessary.  Patient now has been having shortness of breath which mom has noticed when she is on the phone and talking with some mild chest pain and heart rate has been noticeably elevated to the 130s and then with exertion to the 170s.  Patient denies any lightheadedness or dizziness or near syncope with this..  Patient called cardiologist and was told to go to urgent care which sent her to the ED.  On exam patient's heart rate is borderline and blood pressure is normal.  Patient is afebrile and in no significant distress.  There is no JVD and thyroid scar looks normal and well-healed.  Lungs are clear and heart is regular rate and rhythm.  Abdomen is soft nontender there is no midline spinal tenderness noted or CVA tenderness noted.  There is no pitting edema no calf tenderness.  There is normal and strong pulses in all 4 extremities.  Of note patient had similar symptoms with symptoms a week ago and had bilateral DVT studies and CTA done at [facility] that was unremarkable.  In the EMR a CTA is also noted in August of this year that is also negative for PE.  Given patient's history of PE on OCPs and no different OCP patient will always be high risk for PE.  Patient's symptoms are also concerning.  We will look for any other causes for patient's symptoms including electrolyte normality and TSH and troponin and check proBNP.  If any of these unremarkable will continue further work-up.  If not consider CTA..  Patient is not a good candidate for D-dimer as she is not low risk.  Patient is aware of this plan.  Patient offered dose of metoprolol in the ED but states she does not want to take it and also off of metoprolol for home until for cardiology follow-up as she has run out of it but patient currently states that she would prefer to follow-up with her cardiologist.

## 2023-10-30 NOTE — ED CDU PROVIDER INITIAL DAY NOTE - CLINICAL SUMMARY MEDICAL DECISION MAKING FREE TEXT BOX
28-year-old female with history of hyper thyroidism and spontaneous PE (not currently on anticoagulation following 6-month "course) presents for acute on chronic dyspnea.  Of note, patient has been having chronic shortness of breath since being diagnosed with  PE and is followed by a pulmonologist.  Patient notes that today it became suddenly worse prompting visit to the ED.  EKG is notable for new T wave inversions in V3 and V4 when compared to EKG from August.  Labs within normal limits including troponin of 6 > 6.  CTA chest PE protocol negative for pulmonary embolism but but noted to have trace fluid and epicardial recess, no pericardial effusion.  Patient placed in CDU for telemetry monitoring, stress, echo, and will consult patient's cardiologist Dr. PAXTON Barboza ([phone]) in the AM as ED team was unable to reach him in the evening.

## 2023-10-30 NOTE — ED PROVIDER NOTE - OBJECTIVE STATEMENT
29 yo female with pmhx of PE no on AC, thyroidectomy on 9/2023 currernladrianne on Metoprolol and synthroid presents to the ED with CP and SOB. pt states she ran out of her metoprolol medication 4 days ago she has not been taking it. pt states since her PE she has 29 yo female with pmhx of PE no on AC, thyroidectomy on 9/2023 currersilver on Metoprolol and synthroid presents to the ED with CP and SOB. pt states she ran out of her metoprolol medication 4 days ago she has not been taking it. pt states since her PE she has been having SOB but today is more severe. pt states last week she had similar symptoms where had a CT PE study with LE doppler which were both unremarkable. pt denies recent travels, LE edema, fever or chills.

## 2023-10-30 NOTE — ED PROVIDER NOTE - CLINICAL SUMMARY MEDICAL DECISION MAKING FREE TEXT BOX
27 yo female with CP SOB. pt has a history of PE and recent surgery which places pt in higher risk for a PE. will obtain CT PE study to r/o PE.   pt EKG today compared to August shows TWI in V3, V4. will obtain cardiac markers, cardiac monitor

## 2023-10-30 NOTE — ED PROVIDER NOTE - CARDIAC, MLM
Normal rate, regular rhythm.  Heart sounds S1, S2.  No murmurs, rubs or gallops. No LE edema. No calf tenderness b/l

## 2023-10-30 NOTE — ED CDU PROVIDER INITIAL DAY NOTE - NSICDXFAMILYHX_GEN_ALL_CORE_FT
FAMILY HISTORY:  Mother  Still living? Unknown  Family history of heart attack, Age at diagnosis: Age Unknown  FH: hypertension, Age at diagnosis: Age Unknown

## 2023-10-30 NOTE — ED CDU PROVIDER INITIAL DAY NOTE - ATTENDING APP SHARED VISIT CONTRIBUTION OF CARE
Attending note:   After face to face evaluation of this patient, I concur with above noted hx, pe, and care plan for this patient.  Benjamin: Patient-year-old female with history of PE 1 year ago with anticoagulation for 6 months.  At that time PE was felt to be possibly due to OCP.  Patient was then switched to a different OCP.  Patient now presents the ED after diagnosis of Graves' disease and thyroidectomy that has been under control.  Patient was supposed to be on metoprolol but has been off of it for 4 days that she is trying to wean herself off and follow-up with cardiology to see if it is really necessary.  Patient now has been having shortness of breath which mom has noticed when she is on the phone and talking with some mild chest pain and heart rate has been noticeably elevated to the 130s and then with exertion to the 170s.  Patient denies any lightheadedness or dizziness or near syncope with this..  Patient called cardiologist and was told to go to urgent care which sent her to the ED.  On exam patient's heart rate is borderline and blood pressure is normal.  Patient is afebrile and in no significant distress.  There is no JVD and thyroid scar looks normal and well-healed.  Lungs are clear and heart is regular rate and rhythm.  Abdomen is soft nontender there is no midline spinal tenderness noted or CVA tenderness noted.  There is no pitting edema no calf tenderness.  There is normal and strong pulses in all 4 extremities.  Of note patient had similar symptoms with symptoms a week ago and had bilateral DVT studies and CTA done at [facility] that was unremarkable.  In the EMR a CTA is also noted in August of this year that is also negative for PE.  Given patient's history of PE on OCPs and no different OCP patient will always be high risk for PE.  Patient's symptoms are also concerning.  We will look for any other causes for patient's symptoms including electrolyte normality and TSH and troponin and check proBNP.  If any of these unremarkable will continue further work-up.  If not consider CTA..  Patient is not a good candidate for D-dimer as she is not low risk.  Patient is aware of this plan.  Patient offered dose of metoprolol in the ED but states she does not want to take it and also off of metoprolol for home until for cardiology follow-up as she has run out of it but patient currently states that she would prefer to follow-up with her cardiologist.  After initial workup, pt's sxs continued and she did not feel comfortable being discharged. CDU offered for monitoring and echo.

## 2023-10-30 NOTE — ED ADULT NURSE NOTE - OBJECTIVE STATEMENT
Pt received to intake. pt is AxO 3 and ambulatory. Pt c/o palpations, and SOB both on excretion and at rest. Pt endorses hx of graves, PS, and thyroidectomy. Pt states she was at urgent care, but was sent to the ER. Pt respirations even and unlabored on room air. Pt denies headaches, dizziness, N/V/D, abdominal pain, or fever like symptoms. Pt medicated as prescribed. Labs obtained and sent. Pt has a 20G to the left hand. Plan of care ongoing.

## 2023-10-30 NOTE — ED CDU PROVIDER INITIAL DAY NOTE - PHYSICAL EXAMINATION
CONSTITUTIONAL: Well-appearing; well-nourished overweight female; in no apparent distress. Non-toxic appearing.   NEURO: Alert & oriented. Gait steady without assistance. Sensory and motor functions are grossly intact.  PSYCH: Mood appropriate. Thought processes intact.   NECK: Supple  CARD: Regular rate and rhythm, no murmurs  RESP: No accessory muscle use; breath sounds clear and equal bilaterally; no wheezes, rhonchi, or rales     ABD: Soft; non-distended; non-tender. No guarding or rebound.   MUSCULOSKELETAL/EXTREMITIES: FROM in all four extremities; no extremity edema.  SKIN: Warm; dry; no apparent lesions or exudate

## 2023-10-30 NOTE — ED CDU PROVIDER INITIAL DAY NOTE - OBJECTIVE STATEMENT
28-year-old female with history of hyper thyroidism and spontaneous PE (not currently on anticoagulation following 6-month "course) presents for acute on chronic dyspnea.  Of note, patient has been having chronic shortness of breath since being diagnosed with  PE and is followed by a pulmonologist.  Patient notes that today it became suddenly worse prompting visit to the ED.  EKG is notable for new T wave inversions in V3 and V4 when compared to EKG from August.  Labs within normal limits including troponin of 6 > 6.  CTA chest PE protocol negative for pulmonary embolism but but noted to have trace fluid and epicardial recess, no pericardial effusion.  Patient placed in CDU for telemetry monitoring, stress, echo, and will consult patient's cardiologist Dr. PAXTON Barboza ([phone]) in the AM as ED team was unable to reach him in the evening.

## 2023-10-30 NOTE — ED ADULT TRIAGE NOTE - CHIEF COMPLAINT QUOTE
pt sent in by [facility] to r/o PE d/2 c/o palpations, chest pressure and SOB. Hx graves, thyroidectomy

## 2023-10-31 ENCOUNTER — APPOINTMENT (OUTPATIENT)
Dept: CARDIOLOGY | Facility: CLINIC | Age: 28
End: 2023-10-31
Payer: COMMERCIAL

## 2023-10-31 ENCOUNTER — NON-APPOINTMENT (OUTPATIENT)
Age: 28
End: 2023-10-31

## 2023-10-31 VITALS
BODY MASS INDEX: 32.73 KG/M2 | HEART RATE: 84 BPM | OXYGEN SATURATION: 99 % | WEIGHT: 221 LBS | SYSTOLIC BLOOD PRESSURE: 121 MMHG | DIASTOLIC BLOOD PRESSURE: 82 MMHG | HEIGHT: 69 IN

## 2023-10-31 VITALS
HEART RATE: 70 BPM | SYSTOLIC BLOOD PRESSURE: 129 MMHG | DIASTOLIC BLOOD PRESSURE: 79 MMHG | RESPIRATION RATE: 16 BRPM | OXYGEN SATURATION: 96 % | TEMPERATURE: 98 F

## 2023-10-31 DIAGNOSIS — R53.82 CHRONIC FATIGUE, UNSPECIFIED: ICD-10-CM

## 2023-10-31 DIAGNOSIS — E05.00 THYROTOXICOSIS WITH DIFFUSE GOITER W/OUT THYROTOXIC CRISIS OR STORM: ICD-10-CM

## 2023-10-31 DIAGNOSIS — R00.0 TACHYCARDIA, UNSPECIFIED: ICD-10-CM

## 2023-10-31 DIAGNOSIS — R06.02 SHORTNESS OF BREATH: ICD-10-CM

## 2023-10-31 PROCEDURE — 99239 HOSP IP/OBS DSCHRG MGMT >30: CPT

## 2023-10-31 PROCEDURE — 99214 OFFICE O/P EST MOD 30 MIN: CPT | Mod: 25

## 2023-10-31 PROCEDURE — 93000 ELECTROCARDIOGRAM COMPLETE: CPT

## 2023-10-31 PROCEDURE — 93306 TTE W/DOPPLER COMPLETE: CPT | Mod: 26

## 2023-10-31 RX ORDER — LEVOTHYROXINE SODIUM 137 UG/1
137 TABLET ORAL
Refills: 0 | Status: ACTIVE | COMMUNITY

## 2023-10-31 RX ORDER — METOPROLOL TARTRATE 50 MG/1
50 TABLET, FILM COATED ORAL
Qty: 180 | Refills: 2 | Status: ACTIVE | COMMUNITY
Start: 2023-01-06 | End: 1900-01-01

## 2023-10-31 RX ADMIN — Medication 50 MILLIGRAM(S): at 00:04

## 2023-10-31 RX ADMIN — Medication 137 MICROGRAM(S): at 08:03

## 2023-10-31 NOTE — ED CDU PROVIDER DISPOSITION NOTE - NSFOLLOWUPINSTRUCTIONS_ED_ALL_ED_FT
Please follow-up with Dr. Barboza this afternoon as already scheduled    Return to ED if you experience worsening of your symptoms, dizziness, severe chest pain, difficulty breathing or anything else concerning to you.

## 2023-10-31 NOTE — ED CDU PROVIDER DISPOSITION NOTE - CARE PROVIDER_API CALL
Conrado Barboza  Cardiovascular Disease  [address], [address]  [place] [address]  Phone: [phone]  Fax: [phone]  Follow Up Time:

## 2023-10-31 NOTE — ED CDU PROVIDER SUBSEQUENT DAY NOTE - PROGRESS NOTE DETAILS
GAURANG Hill - Spoke with patients cardiologist Dr. Barboza- states had negative echo in office less than 6 mo ago and states that a stress test would not be a useful tool for patients dyspnea work up. Recommending cancelling stress in ER, states patient would benefit from stress echo - can be done outpt.   Echo was performed this morning - no read back yet, does not need to stay for final echo result -0 will follow up with Dr. Barboza this afternoon in the office.

## 2023-10-31 NOTE — ED CDU PROVIDER DISPOSITION NOTE - PATIENT PORTAL LINK FT
You can access the FollowMyHealth Patient Portal offered by [facility] by registering at the following website: [email or web address]. By joining [employer]’s FollowMyHealth portal, you will also be able to view your health information using other applications (apps) compatible with our system.

## 2023-10-31 NOTE — ED CDU PROVIDER SUBSEQUENT DAY NOTE - CLINICAL SUMMARY MEDICAL DECISION MAKING FREE TEXT BOX
28-year-old female with history of hyper thyroidism and spontaneous PE (not currently on anticoagulation following 6-month "course) presents for acute on chronic dyspnea.  Of note, patient has been having chronic shortness of breath since being diagnosed with  PE and is followed by a pulmonologist.  Patient notes that today it became suddenly worse prompting visit to the ED.  EKG is notable for new T wave inversions in V3 and V4 when compared to EKG from August.  Labs within normal limits including troponin of 6 > 6.  CTA chest PE protocol negative for pulmonary embolism but but noted to have trace fluid and epicardial recess, no pericardial effusion.  Patient placed in CDU for telemetry monitoring, stress, echo, and will consult patient's cardiologist Dr. PAXTON Barboza ([phone]) in the AM as ED team was unable to reach him in the evening.

## 2023-10-31 NOTE — ED CDU PROVIDER SUBSEQUENT DAY NOTE - ATTENDING APP SHARED VISIT CONTRIBUTION OF CARE
28F h/o hyperthyroidism and spontaneous PE (not currently on anticoagulation following completion of 6month course) p/w chronic dyspnea that has been present since the initial diagnosis of her PE for which she is following with a pulmonologist. ED workup notable for EKG with new T wave inversions in V3 and V4 when compared to EKG from August.  Labs within normal limits including troponin of 6 --> 6.  CTA chest PE protocol negative for PE. Pt dispo to CDU for tele monitoring, stress and TTE. This AM resting comfortably in bed with no complaints. Ambulating to and from bathroom without worsening symptoms.

## 2023-10-31 NOTE — ED CDU PROVIDER DISPOSITION NOTE - CLINICAL COURSE
28F h/o hyperthyroidism and spontaneous PE (not currently on anticoagulation following completion of 6month course) p/w chronic dyspnea that has been present since the initial diagnosis of her PE for which she is following with a pulmonologist. ED workup notable for EKG with new T wave inversions in V3 and V4 when compared to EKG from August.  Labs within normal limits including troponin of 6 --> 6.  CTA chest PE protocol negative for PE. Pt dispo to CDU for tele monitoring, stress and TTE. Care discussed with Dr. Barboza this AM. He stated that patient had a normal TTE < 6 months ago and does not require repeat TTE or stress test in hospital. He has a follow-up appointment scheduled with her today and will see in office. If anything, he plans on performing a stress echo if warranted and states he will do in office in needed. At this time, stable for dc without additional testing. This plan was discussed with patient in detail and she is in agreement. All questions answered at time of discharge.

## 2023-10-31 NOTE — ED CDU PROVIDER SUBSEQUENT DAY NOTE - HISTORY
In interim hx no events on tele monitoring through the night. Awaiting stress and echo.      28-year-old female with history of hyper thyroidism and spontaneous PE (not currently on anticoagulation following 6-month "course) presents for acute on chronic dyspnea.  Of note, patient has been having chronic shortness of breath since being diagnosed with  PE and is followed by a pulmonologist.  Patient notes that today it became suddenly worse prompting visit to the ED.  EKG is notable for new T wave inversions in V3 and V4 when compared to EKG from August.  Labs within normal limits including troponin of 6 > 6.  CTA chest PE protocol negative for pulmonary embolism but but noted to have trace fluid and epicardial recess, no pericardial effusion.  Patient placed in CDU for telemetry monitoring, stress, echo, and will consult patient's cardiologist Dr. PAXTON Barboza ([phone]) in the AM as ED team was unable to reach him in the evening.

## 2023-11-10 PROBLEM — E05.90 THYROTOXICOSIS, UNSPECIFIED WITHOUT THYROTOXIC CRISIS OR STORM: Chronic | Status: ACTIVE | Noted: 2023-10-30

## 2023-11-10 PROBLEM — I26.99 OTHER PULMONARY EMBOLISM WITHOUT ACUTE COR PULMONALE: Chronic | Status: ACTIVE | Noted: 2023-10-30

## 2023-11-10 PROBLEM — K21.9 GASTRO-ESOPHAGEAL REFLUX DISEASE WITHOUT ESOPHAGITIS: Chronic | Status: ACTIVE | Noted: 2023-10-30

## 2023-12-11 ENCOUNTER — APPOINTMENT (OUTPATIENT)
Dept: CARDIOLOGY | Facility: CLINIC | Age: 28
End: 2023-12-11

## 2024-01-07 PROBLEM — Z78.9 OTHER SPECIFIED HEALTH STATUS: Chronic | Status: INACTIVE | Noted: 2023-08-23 | Resolved: 2023-10-30

## 2024-07-24 ENCOUNTER — EMERGENCY (EMERGENCY)
Facility: HOSPITAL | Age: 29
LOS: 1 days | Discharge: HOME | End: 2024-07-24
Admitting: EMERGENCY MEDICINE
Payer: MEDICAID

## 2024-07-24 VITALS
WEIGHT: 225.09 LBS | RESPIRATION RATE: 18 BRPM | OXYGEN SATURATION: 97 % | DIASTOLIC BLOOD PRESSURE: 83 MMHG | SYSTOLIC BLOOD PRESSURE: 127 MMHG | TEMPERATURE: 99 F | HEART RATE: 80 BPM

## 2024-07-24 LAB
ALBUMIN SERPL ELPH-MCNC: 4.4 G/DL — SIGNIFICANT CHANGE UP (ref 3.3–5)
ALP SERPL-CCNC: 110 U/L — SIGNIFICANT CHANGE UP (ref 40–120)
ALT FLD-CCNC: 24 U/L — SIGNIFICANT CHANGE UP (ref 4–33)
ANION GAP SERPL CALC-SCNC: 12 MMOL/L — SIGNIFICANT CHANGE UP (ref 7–14)
AST SERPL-CCNC: 18 U/L — SIGNIFICANT CHANGE UP (ref 4–32)
BASOPHILS # BLD AUTO: 0.03 K/UL — SIGNIFICANT CHANGE UP (ref 0–0.2)
BASOPHILS NFR BLD AUTO: 0.3 % — SIGNIFICANT CHANGE UP (ref 0–2)
BILIRUB SERPL-MCNC: 0.3 MG/DL — SIGNIFICANT CHANGE UP (ref 0.2–1.2)
BUN SERPL-MCNC: 9 MG/DL — SIGNIFICANT CHANGE UP (ref 7–23)
CALCIUM SERPL-MCNC: 9.4 MG/DL — SIGNIFICANT CHANGE UP (ref 8.4–10.5)
CHLORIDE SERPL-SCNC: 104 MMOL/L — SIGNIFICANT CHANGE UP (ref 98–107)
CO2 SERPL-SCNC: 25 MMOL/L — SIGNIFICANT CHANGE UP (ref 22–31)
CREAT SERPL-MCNC: 0.92 MG/DL — SIGNIFICANT CHANGE UP (ref 0.5–1.3)
D DIMER BLD IA.RAPID-MCNC: <150 NG/ML DDU — SIGNIFICANT CHANGE UP
EGFR: 88 ML/MIN/1.73M2 — SIGNIFICANT CHANGE UP
EGFR: 88 ML/MIN/1.73M2 — SIGNIFICANT CHANGE UP
EOSINOPHIL # BLD AUTO: 0.15 K/UL — SIGNIFICANT CHANGE UP (ref 0–0.5)
EOSINOPHIL NFR BLD AUTO: 1.6 % — SIGNIFICANT CHANGE UP (ref 0–6)
GLUCOSE SERPL-MCNC: 80 MG/DL — SIGNIFICANT CHANGE UP (ref 70–99)
HCT VFR BLD CALC: 42.2 % — SIGNIFICANT CHANGE UP (ref 34.5–45)
HGB BLD-MCNC: 14.4 G/DL — SIGNIFICANT CHANGE UP (ref 11.5–15.5)
IANC: 4.91 K/UL — SIGNIFICANT CHANGE UP (ref 1.8–7.4)
IMM GRANULOCYTES NFR BLD AUTO: 0.2 % — SIGNIFICANT CHANGE UP (ref 0–0.9)
LYMPHOCYTES # BLD AUTO: 3.86 K/UL — HIGH (ref 1–3.3)
LYMPHOCYTES # BLD AUTO: 40.2 % — SIGNIFICANT CHANGE UP (ref 13–44)
MCHC RBC-ENTMCNC: 30.4 PG — SIGNIFICANT CHANGE UP (ref 27–34)
MCHC RBC-ENTMCNC: 34.1 GM/DL — SIGNIFICANT CHANGE UP (ref 32–36)
MCV RBC AUTO: 89 FL — SIGNIFICANT CHANGE UP (ref 80–100)
MONOCYTES # BLD AUTO: 0.64 K/UL — SIGNIFICANT CHANGE UP (ref 0–0.9)
MONOCYTES NFR BLD AUTO: 6.7 % — SIGNIFICANT CHANGE UP (ref 2–14)
NEUTROPHILS # BLD AUTO: 4.91 K/UL — SIGNIFICANT CHANGE UP (ref 1.8–7.4)
NEUTROPHILS NFR BLD AUTO: 51 % — SIGNIFICANT CHANGE UP (ref 43–77)
NRBC # BLD AUTO: 0 K/UL — SIGNIFICANT CHANGE UP (ref 0–0)
NRBC # BLD: 0 /100 WBCS — SIGNIFICANT CHANGE UP (ref 0–0)
NRBC # FLD: 0 K/UL — SIGNIFICANT CHANGE UP (ref 0–0)
NRBC BLD-RTO: 0 /100 WBCS — SIGNIFICANT CHANGE UP (ref 0–0)
NT-PROBNP SERPL-SCNC: <36 PG/ML — SIGNIFICANT CHANGE UP
PLATELET # BLD AUTO: 237 K/UL — SIGNIFICANT CHANGE UP (ref 150–400)
POTASSIUM SERPL-MCNC: 3.7 MMOL/L — SIGNIFICANT CHANGE UP (ref 3.5–5.3)
POTASSIUM SERPL-SCNC: 3.7 MMOL/L — SIGNIFICANT CHANGE UP (ref 3.5–5.3)
PROT SERPL-MCNC: 7 G/DL — SIGNIFICANT CHANGE UP (ref 6–8.3)
RBC # BLD: 4.74 M/UL — SIGNIFICANT CHANGE UP (ref 3.8–5.2)
RBC # FLD: 12 % — SIGNIFICANT CHANGE UP (ref 10.3–14.5)
SODIUM SERPL-SCNC: 141 MMOL/L — SIGNIFICANT CHANGE UP (ref 135–145)
T3 SERPL-MCNC: 90 NG/DL — SIGNIFICANT CHANGE UP (ref 80–200)
T4 AB SER-ACNC: 6.02 UG/DL — SIGNIFICANT CHANGE UP (ref 5.1–13)
T4 FREE SERPL-MCNC: 1.4 NG/DL — SIGNIFICANT CHANGE UP (ref 0.9–1.8)
TROPONIN T, HIGH SENSITIVITY RESULT: <6 NG/L — SIGNIFICANT CHANGE UP
TSH SERPL-MCNC: 0.85 UIU/ML — SIGNIFICANT CHANGE UP (ref 0.27–4.2)
WBC # BLD: 9.61 K/UL — SIGNIFICANT CHANGE UP (ref 3.8–10.5)
WBC # FLD AUTO: 9.61 K/UL — SIGNIFICANT CHANGE UP (ref 3.8–10.5)

## 2024-07-24 PROCEDURE — 99285 EMERGENCY DEPT VISIT HI MDM: CPT

## 2024-07-24 PROCEDURE — 71046 X-RAY EXAM CHEST 2 VIEWS: CPT | Mod: 26

## 2024-07-24 PROCEDURE — 93010 ELECTROCARDIOGRAM REPORT: CPT

## 2024-07-24 PROCEDURE — 93971 EXTREMITY STUDY: CPT | Mod: 26,LT

## 2024-07-25 VITALS
TEMPERATURE: 98 F | OXYGEN SATURATION: 98 % | DIASTOLIC BLOOD PRESSURE: 72 MMHG | SYSTOLIC BLOOD PRESSURE: 112 MMHG | RESPIRATION RATE: 17 BRPM | HEART RATE: 61 BPM

## 2024-08-17 ENCOUNTER — INPATIENT (INPATIENT)
Facility: HOSPITAL | Age: 29
LOS: 4 days | Discharge: HOME | End: 2024-08-22
Attending: STUDENT IN AN ORGANIZED HEALTH CARE EDUCATION/TRAINING PROGRAM | Admitting: PSYCHIATRY & NEUROLOGY
Payer: COMMERCIAL

## 2024-08-17 VITALS
TEMPERATURE: 98 F | SYSTOLIC BLOOD PRESSURE: 117 MMHG | OXYGEN SATURATION: 97 % | HEART RATE: 62 BPM | DIASTOLIC BLOOD PRESSURE: 79 MMHG | RESPIRATION RATE: 14 BRPM

## 2024-08-17 DIAGNOSIS — F31.9 BIPOLAR DISORDER, UNSPECIFIED: ICD-10-CM

## 2024-08-17 DIAGNOSIS — F39 UNSPECIFIED MOOD [AFFECTIVE] DISORDER: ICD-10-CM

## 2024-08-17 LAB
ADD ON TEST-SPECIMEN IN LAB: SIGNIFICANT CHANGE UP
ALBUMIN SERPL ELPH-MCNC: 4.2 G/DL — SIGNIFICANT CHANGE UP (ref 3.3–5)
ALP SERPL-CCNC: 116 U/L — SIGNIFICANT CHANGE UP (ref 40–120)
ALT FLD-CCNC: 20 U/L — SIGNIFICANT CHANGE UP (ref 4–33)
AMPHET UR-MCNC: NEGATIVE — SIGNIFICANT CHANGE UP
ANION GAP SERPL CALC-SCNC: 15 MMOL/L — HIGH (ref 7–14)
APAP SERPL-MCNC: <10 UG/ML — LOW (ref 15–25)
APPEARANCE UR: ABNORMAL
AST SERPL-CCNC: 18 U/L — SIGNIFICANT CHANGE UP (ref 4–32)
BACTERIA # UR AUTO: ABNORMAL /HPF
BARBITURATES UR SCN-MCNC: NEGATIVE — SIGNIFICANT CHANGE UP
BASOPHILS # BLD AUTO: 0.03 K/UL — SIGNIFICANT CHANGE UP (ref 0–0.2)
BASOPHILS NFR BLD AUTO: 0.3 % — SIGNIFICANT CHANGE UP (ref 0–2)
BENZODIAZ UR-MCNC: NEGATIVE — SIGNIFICANT CHANGE UP
BILIRUB SERPL-MCNC: 0.3 MG/DL — SIGNIFICANT CHANGE UP (ref 0.2–1.2)
BILIRUB UR-MCNC: NEGATIVE — SIGNIFICANT CHANGE UP
BUN SERPL-MCNC: 13 MG/DL — SIGNIFICANT CHANGE UP (ref 7–23)
CALCIUM SERPL-MCNC: 9.1 MG/DL — SIGNIFICANT CHANGE UP (ref 8.4–10.5)
CAST: 0 /LPF — SIGNIFICANT CHANGE UP (ref 0–4)
CHLORIDE SERPL-SCNC: 107 MMOL/L — SIGNIFICANT CHANGE UP (ref 98–107)
CO2 SERPL-SCNC: 18 MMOL/L — LOW (ref 22–31)
COCAINE METAB.OTHER UR-MCNC: NEGATIVE — SIGNIFICANT CHANGE UP
COLOR SPEC: YELLOW — SIGNIFICANT CHANGE UP
CREAT SERPL-MCNC: 0.94 MG/DL — SIGNIFICANT CHANGE UP (ref 0.5–1.3)
CREATININE URINE RESULT, DAU: 292 MG/DL — SIGNIFICANT CHANGE UP
DIFF PNL FLD: NEGATIVE — SIGNIFICANT CHANGE UP
EGFR: 85 ML/MIN/1.73M2 — SIGNIFICANT CHANGE UP
EOSINOPHIL # BLD AUTO: 0.18 K/UL — SIGNIFICANT CHANGE UP (ref 0–0.5)
EOSINOPHIL NFR BLD AUTO: 2.1 % — SIGNIFICANT CHANGE UP (ref 0–6)
ETHANOL SERPL-MCNC: <10 MG/DL — SIGNIFICANT CHANGE UP
FENTANYL UR QL SCN: NEGATIVE — SIGNIFICANT CHANGE UP
GLUCOSE SERPL-MCNC: 104 MG/DL — HIGH (ref 70–99)
GLUCOSE UR QL: NEGATIVE MG/DL — SIGNIFICANT CHANGE UP
HCG SERPL-ACNC: <1 MIU/ML — SIGNIFICANT CHANGE UP
HCT VFR BLD CALC: 42.2 % — SIGNIFICANT CHANGE UP (ref 34.5–45)
HGB BLD-MCNC: 14.5 G/DL — SIGNIFICANT CHANGE UP (ref 11.5–15.5)
IANC: 4.06 K/UL — SIGNIFICANT CHANGE UP (ref 1.8–7.4)
IMM GRANULOCYTES NFR BLD AUTO: 0.1 % — SIGNIFICANT CHANGE UP (ref 0–0.9)
KETONES UR-MCNC: NEGATIVE MG/DL — SIGNIFICANT CHANGE UP
LEUKOCYTE ESTERASE UR-ACNC: NEGATIVE — SIGNIFICANT CHANGE UP
LYMPHOCYTES # BLD AUTO: 3.7 K/UL — HIGH (ref 1–3.3)
LYMPHOCYTES # BLD AUTO: 42.3 % — SIGNIFICANT CHANGE UP (ref 13–44)
MCHC RBC-ENTMCNC: 30.2 PG — SIGNIFICANT CHANGE UP (ref 27–34)
MCHC RBC-ENTMCNC: 34.4 GM/DL — SIGNIFICANT CHANGE UP (ref 32–36)
MCV RBC AUTO: 87.9 FL — SIGNIFICANT CHANGE UP (ref 80–100)
METHADONE UR-MCNC: NEGATIVE — SIGNIFICANT CHANGE UP
MONOCYTES # BLD AUTO: 0.76 K/UL — SIGNIFICANT CHANGE UP (ref 0–0.9)
MONOCYTES NFR BLD AUTO: 8.7 % — SIGNIFICANT CHANGE UP (ref 2–14)
NEUTROPHILS # BLD AUTO: 4.06 K/UL — SIGNIFICANT CHANGE UP (ref 1.8–7.4)
NEUTROPHILS NFR BLD AUTO: 46.5 % — SIGNIFICANT CHANGE UP (ref 43–77)
NITRITE UR-MCNC: NEGATIVE — SIGNIFICANT CHANGE UP
NRBC # BLD: 0 /100 WBCS — SIGNIFICANT CHANGE UP (ref 0–0)
NRBC # FLD: 0 K/UL — SIGNIFICANT CHANGE UP (ref 0–0)
OPIATES UR-MCNC: NEGATIVE — SIGNIFICANT CHANGE UP
OXYCODONE UR-MCNC: NEGATIVE — SIGNIFICANT CHANGE UP
PCP SPEC-MCNC: SIGNIFICANT CHANGE UP
PCP UR-MCNC: NEGATIVE — SIGNIFICANT CHANGE UP
PH UR: 5.5 — SIGNIFICANT CHANGE UP (ref 5–8)
PLATELET # BLD AUTO: 248 K/UL — SIGNIFICANT CHANGE UP (ref 150–400)
POTASSIUM SERPL-MCNC: 4 MMOL/L — SIGNIFICANT CHANGE UP (ref 3.5–5.3)
POTASSIUM SERPL-SCNC: 4 MMOL/L — SIGNIFICANT CHANGE UP (ref 3.5–5.3)
PROT SERPL-MCNC: 7 G/DL — SIGNIFICANT CHANGE UP (ref 6–8.3)
PROT UR-MCNC: NEGATIVE MG/DL — SIGNIFICANT CHANGE UP
RBC # BLD: 4.8 M/UL — SIGNIFICANT CHANGE UP (ref 3.8–5.2)
RBC # FLD: 12.1 % — SIGNIFICANT CHANGE UP (ref 10.3–14.5)
RBC CASTS # UR COMP ASSIST: 1 /HPF — SIGNIFICANT CHANGE UP (ref 0–4)
SALICYLATES SERPL-MCNC: <0.3 MG/DL — LOW (ref 15–30)
SARS-COV-2 RNA SPEC QL NAA+PROBE: SIGNIFICANT CHANGE UP
SODIUM SERPL-SCNC: 140 MMOL/L — SIGNIFICANT CHANGE UP (ref 135–145)
SP GR SPEC: 1.03 — SIGNIFICANT CHANGE UP (ref 1–1.03)
SQUAMOUS # UR AUTO: 23 /HPF — HIGH (ref 0–5)
THC UR QL: NEGATIVE — SIGNIFICANT CHANGE UP
TOXICOLOGY SCREEN, DRUGS OF ABUSE, SERUM RESULT: SIGNIFICANT CHANGE UP
TROPONIN T, HIGH SENSITIVITY RESULT: <6 NG/L — SIGNIFICANT CHANGE UP
TSH SERPL-MCNC: 0.46 UIU/ML — SIGNIFICANT CHANGE UP (ref 0.27–4.2)
UROBILINOGEN FLD QL: 0.2 MG/DL — SIGNIFICANT CHANGE UP (ref 0.2–1)
WBC # BLD: 8.74 K/UL — SIGNIFICANT CHANGE UP (ref 3.8–10.5)
WBC # FLD AUTO: 8.74 K/UL — SIGNIFICANT CHANGE UP (ref 3.8–10.5)
WBC UR QL: 4 /HPF — SIGNIFICANT CHANGE UP (ref 0–5)

## 2024-08-17 PROCEDURE — 99285 EMERGENCY DEPT VISIT HI MDM: CPT | Mod: GC

## 2024-08-17 PROCEDURE — 99285 EMERGENCY DEPT VISIT HI MDM: CPT

## 2024-08-17 PROCEDURE — 71046 X-RAY EXAM CHEST 2 VIEWS: CPT | Mod: 26

## 2024-08-17 RX ORDER — DIPHENHYDRAMINE HCL 50 MG
50 CAPSULE ORAL EVERY 6 HOURS
Refills: 0 | Status: DISCONTINUED | OUTPATIENT
Start: 2024-08-18 | End: 2024-08-22

## 2024-08-17 RX ORDER — HALOPERIDOL 1 MG
5 TABLET ORAL ONCE
Refills: 0 | Status: DISCONTINUED | OUTPATIENT
Start: 2024-08-18 | End: 2024-08-22

## 2024-08-17 RX ORDER — LORAZEPAM 4 MG/ML
2 INJECTION INTRAMUSCULAR; INTRAVENOUS EVERY 6 HOURS
Refills: 0 | Status: DISCONTINUED | OUTPATIENT
Start: 2024-08-18 | End: 2024-08-21

## 2024-08-17 RX ORDER — GLYCOPYRROLATE 0.2 MG/ML
2 INJECTION INTRAMUSCULAR; INTRAVENOUS
Refills: 0 | Status: DISCONTINUED | OUTPATIENT
Start: 2024-08-18 | End: 2024-08-22

## 2024-08-17 RX ORDER — ZOLPIDEM TARTRATE 5 MG/1
5 TABLET, FILM COATED ORAL AT BEDTIME
Refills: 0 | Status: DISCONTINUED | OUTPATIENT
Start: 2024-08-18 | End: 2024-08-22

## 2024-08-17 RX ORDER — PANTOPRAZOLE SODIUM 40 MG
40 TABLET, DELAYED RELEASE (ENTERIC COATED) ORAL
Refills: 0 | Status: DISCONTINUED | OUTPATIENT
Start: 2024-08-18 | End: 2024-08-22

## 2024-08-17 RX ORDER — HALOPERIDOL 1 MG
5 TABLET ORAL EVERY 6 HOURS
Refills: 0 | Status: DISCONTINUED | OUTPATIENT
Start: 2024-08-18 | End: 2024-08-19

## 2024-08-17 RX ORDER — LEVOTHYROXINE SODIUM 100 MCG
137 TABLET ORAL DAILY
Refills: 0 | Status: DISCONTINUED | OUTPATIENT
Start: 2024-08-18 | End: 2024-08-22

## 2024-08-17 RX ORDER — LORAZEPAM 4 MG/ML
2 INJECTION INTRAMUSCULAR; INTRAVENOUS ONCE
Refills: 0 | Status: DISCONTINUED | OUTPATIENT
Start: 2024-08-18 | End: 2024-08-21

## 2024-08-17 RX ORDER — DIPHENHYDRAMINE HCL 50 MG
50 CAPSULE ORAL ONCE
Refills: 0 | Status: DISCONTINUED | OUTPATIENT
Start: 2024-08-18 | End: 2024-08-22

## 2024-08-17 RX ORDER — METOPROLOL TARTRATE 100 MG/1
50 TABLET ORAL DAILY
Refills: 0 | Status: DISCONTINUED | OUTPATIENT
Start: 2024-08-19 | End: 2024-08-18

## 2024-08-17 NOTE — ED BEHAVIORAL HEALTH ASSESSMENT NOTE - OTHER PAST PSYCHIATRIC HISTORY (INCLUDE DETAILS REGARDING ONSET, COURSE OF ILLNESS, INPATIENT/OUTPATIENT TREATMENT)
no previous IP hospitalizations, in outpatient psychiatric treatment with Dr. Azael Butler at [facility], in psychotherapy with Kyra at [facility] (seen bi-weekly), no previous hx of SA, remote hx of SIB (scratching-last performed in 2018)

## 2024-08-17 NOTE — ED BEHAVIORAL HEALTH ASSESSMENT NOTE - DETAILS
Attempted to contact outpatient psychiatrist, but unable to LVM Patient presents to the ED in the context of suicidal gesture, denies intent was to end her life, but reports that she wanted to cause bodily injury to herself LATHA made aware

## 2024-08-17 NOTE — ED BEHAVIORAL HEALTH ASSESSMENT NOTE - NSBHATTESTCOMMENTATTENDFT_PSY_A_CORE
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 Azael Butler via telehealth at [facility], denies hx of violence/legal issues, PMHx of Graves' disease (s/p total thyroidectomy in September 2023), SVT, hyperhydrosis, GERD, and hx of B/L PE in December 2022, BIB by self, referred by outpatient psychiatrist, for worsening mood and suicidal thoughts/gesture this morning with thoughts to walk in front of train. Pt reports history of bipolar II, though the symptoms she describes (brief periods of elevated mood lasting several hours, hx self-injury, vague paranoia, provocative nature of responses while attempting to safety plan today) may be better explained by Cluster B pathology. Pt also with ongoing stressor of stressful school coursework contributing to mood symptoms. Currently, pt is unable to fully engage in safety planning, has access to means (reports having machete in her home), has had poor treatment response to current medication regimen and has multiple non-modifiable chronic risk factors (hx of SIB, chronic of mood d/o), placing this patient at elevated risk of harm. At this time, patient does meet criteria for voluntary hospitalization and will benefit from inpatient hospitalization for safety and stabilization.

## 2024-08-17 NOTE — ED BEHAVIORAL HEALTH ASSESSMENT NOTE - HPI (INCLUDE ILLNESS QUALITY, SEVERITY, DURATION, TIMING, CONTEXT, MODIFYING FACTORS, ASSOCIATED SIGNS AND SYMPTOMS)
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 Azael Butler via telehealth at [facility], denies hx of violence/legal issues, BIB by self, referred by outpatient psychiatrist, for suicidal behavior and worsening mood.     On exam, patient is calm and cooperative, Patient reports waking up at 4:00 am this morning to go to work. She states she became hysterical after she believed she her dog was having a seizure. Patient states she then drove to the train station, entered the train and got off at [facility]. Patient states she became upset when she realized that train was taking alternative route due to weekend schedule. Patient states she returned home. After getting off the train, patient decided to walk into the train with the intent to harm herself, but stopped herself because she did not want to inconvenience the .  Patient states that she then went home, attempted to fall asleep, and informed her psychiatrist, who recommended that she be evaluated in the ED.     Over the last week, patient reports fluctuations in mood sxs. Patient reports experiencing depressive sxs (amotivation, anergia, poor concentration) and reports "bouts of energy" in which she states she has been "listening to music and getting really into it". 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 Azael Butler via telehealth at [facility], denies hx of violence/legal issues, BIB by self, referred by outpatient psychiatrist, for suicidal behavior and worsening mood.     On exam, patient is calm and cooperative, Patient reports waking up at 4:00 am this morning to go to work. She states she became hysterical after she believed she her dog was having a seizure. Patient states she then drove to the train station, entered the train and got off at [facility]. Patient states she became upset when she realized that train was taking alternative route due to weekend schedule. Patient states she returned home. After getting off the train, patient decided to walk into the train with the intent to harm herself, but stopped herself because she did not want to inconvenience the .  Patient states that she then went home, attempted to fall asleep, and informed her psychiatrist, who recommended that she be evaluated in the ED.     Over the last week, patient reports fluctuations in mood sxs. Patient reports experiencing depressive sxs (amotivation, anergia, poor concentration, passive SI) and reports "bouts of energy" in which she states she has been "listening to music and getting really into it". Patient denies additional symptoms of mayo including decreased need for sleep (reports sleeping 7-8 hrs/night), increased goal-directed activity, grandiosity. 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 Azael Butler via telehealth at [facility], denies hx of violence/legal issues, PMHx of Graves' disease (s/p total thyroidectomy in 2023), SVT, hyperhydrosis, GERD, and hx of B/L PE in 2022, BIB by self, referred by outpatient psychiatrist, for suicidal behavior and worsening mood.     On exam, patient is calm and cooperative, Patient reports waking up at 4:00 am this morning to go to work. She states she became hysterical after she believed she her dog was having a seizure. Patient states she then drove to the train station, entered the train and got off at [facility]. Patient states she became upset when she realized that train was taking alternative route due to weekend schedule. Patient states she returned home. After getting off the train, patient decided to walk into the train with the intent to harm herself, but stopped herself because she did not want to inconvenience the .  Patient states that she then went home, attempted to fall asleep, and informed her psychiatrist, who recommended that she be evaluated in the ED. Patient     Over the last week, patient reports fluctuations in mood sxs. Patient reports experiencing depressive sxs (amotivation, anergia, poor concentration, passive SI) and reports "bouts of energy" in which she states she has been "listening to music and getting really into it". Patient denies additional symptoms of mayo including decreased need for sleep (reports sleeping 7-8 hrs/night), increased goal-directed activity, grandiosity.  Within the last week, patient states she has been ruminating on different plans to end her life (SA via OD, stabbing herself with machete in the home). Patient endorses vague AH, occurring over the last week, in which she describes "hearing her partner's phone buzzing", "heard her mom screaming", and "heard my partner telling me that my aunt had ". Denies previous hx of perceptual disturbances. Patient denies additional psychotic sxs including IOR, thought insertion/withdrawal, etc. Patient endorses vague hx of paranoia, in which she states she previously was concerned that people were able to see/watch her from her seventh floor apartment? When asked about suicidality, patient discloses that intent of recent suicidal behavior was not performed with the intent to end her life, as patient states she "I didn't want to die, I thought it would injure me to the point of hospitalization" and "I didn't want to be in society". Patient is elusive about status of current suicidality. When asked about current SI, patient states "well there are no cars here". Patient unable to engage in safety planning, as she states "I'll try to", when asked if she believes that she can remain safe, if discharged from the hospital. 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 Azael Butler via telehealth at [facility], denies hx of violence/legal issues, PMHx of Graves' disease (s/p total thyroidectomy in 2023), SVT, hyperhydrosis, GERD, and hx of B/L PE in 2022, BIB by self, referred by outpatient psychiatrist, for suicidal behavior and worsening mood.    On exam, patient is calm and cooperative, Patient reports waking up at 4:00 am this morning to go to work. She states she became hysterical after she believed she her dog was having a seizure. Patient states she then drove to the train station, entered the train and got off at [facility]. Patient states she became upset when she realized that train was taking alternative route due to weekend schedule. Patient states she returned home. After getting off the train, patient decided to walk into the train with the intent to harm herself, but stopped herself because she did not want to inconvenience the .  Patient states that she then went home, attempted to fall asleep, and informed her psychiatrist, who recommended that she be evaluated in the ED. Prior to the last week, patient states she that her mood had been "down", but not as severely depressed as current mood. Patient attributes this psychosocial stressors, which she identifies is primarily due to school/work.    Over the last week, patient reports fluctuations in mood sxs. Patient reports experiencing depressive sxs (amotivation, anergia, poor concentration, passive SI, poor self-care) and reports "bouts of energy" in which she states she has been "listening to music and getting really into it". Patient denies additional symptoms of mayo including decreased need for sleep (reports sleeping 7-8 hrs/night), increased goal-directed activity, grandiosity.  Within the last week, patient states she has been ruminating on different plans to end her life (SA via OD, stabbing herself with machete in the home). Patient endorses vague AH, occurring over the last week, in which she describes "hearing her partner's phone buzzing", "heard her mom screaming", and "heard my partner telling me that my aunt had ". Denies previous hx of perceptual disturbances. Patient denies additional psychotic sxs including IOR, thought insertion/withdrawal, etc. Patient endorses vague hx of paranoia, in which she states she previously was concerned that people were able to see/watch her from her seventh floor apartment? When asked about suicidality, patient discloses that intent of recent suicidal behavior was not performed with the intent to end her life, as patient states she "I didn't want to die, I thought it would injure me to the point of hospitalization" and "I didn't want to be in society". Patient is elusive about status of current suicidality. When asked about current SI, patient states "well there are no cars here". Patient unable to engage in safety planning, as she states "I'll try to", when asked if she believes that she can remain safe, if discharged from the hospital.    Collateral from partner, Dwayne: Reports patient has poor sleep, inconsolably crying over the last several days. States that patient had chronic hx of endorsing SI, but no safety concerns until the last few days, at which time patient's mood sxs became worse. Over the last month, reports that patient has been stressed and anxious due to school and work, recently abruptly took the month off, as a result. 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 Azael Butler via telehealth at [facility], denies hx of violence/legal issues, PMHx of Graves' disease (s/p total thyroidectomy in 2023), SVT, hyperhydrosis, GERD, and hx of B/L PE in 2022, BIB by self, referred by outpatient psychiatrist, for suicidal behavior and worsening mood.    On exam, patient is calm and cooperative, Patient reports waking up at 4:00 am this morning to go to work. She states she became hysterical after she believed she her dog was having a seizure. Patient states she then drove to the train station, entered the train and got off at [facility]. Patient states she became upset when she realized that train was taking alternative route due to weekend schedule. Patient states she returned home. After getting off the train, patient decided to walk into the train with the intent to harm herself, but stopped herself because she did not want to inconvenience the .  Patient states that she then went home, attempted to fall asleep, and informed her psychiatrist, who recommended that she be evaluated in the ED. Prior to the last week, patient states she that her mood had been "down", but not as severely depressed as current mood. Patient attributes this psychosocial stressors, which she identifies is primarily due to school/work.    Over the last week, patient reports fluctuations in mood sxs. Patient reports experiencing depressive sxs (amotivation, anergia, poor concentration, passive SI, poor self-care) and reports "bouts of energy" in which she states she has been "listening to music and getting really into it". Patient denies additional symptoms of mayo including decreased need for sleep (reports sleeping 7-8 hrs/night), increased goal-directed activity, grandiosity.  Within the last week, patient states she has been ruminating on different plans to end her life (SA via OD, stabbing herself with machete in the home). Patient endorses vague AH, occurring over the last week, in which she describes "hearing her partner's phone buzzing", "heard her mom screaming", and "heard my partner telling me that my aunt had ". Denies previous hx of perceptual disturbances. Patient denies additional psychotic sxs including IOR, thought insertion/withdrawal, etc. Patient endorses vague hx of paranoia, in which she states she previously was concerned that people were able to see/watch her from her seventh floor apartment. When asked about suicidality, patient discloses that intent of recent suicidal behavior was not performed with the intent to end her life, as patient states she "I didn't want to die, I thought it would injure me to the point of hospitalization" and "I didn't want to be in society". Patient is elusive about status of current suicidality. When asked about current SI, patient states "well there are no cars here". Patient unable to engage in safety planning, as she states "I'll try to", when asked if she believes that she can remain safe, if discharged from the hospital.    Collateral from partner, Dwayne: Reports patient has poor sleep, inconsolably crying over the last several days. States that patient had chronic hx of endorsing SI, but no safety concerns until the last few days, at which time patient's mood sxs became worse. Over the last month, reports that patient has been stressed and anxious due to school and work, recently abruptly took the month off, as a result. 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 Azael Butler via telehealth at [facility] and biweekly psychotherapy with Giselle at [facility], denies hx of violence/legal issues, PMHx of Graves' disease (s/p total thyroidectomy in 2023), SVT, hyperhydrosis, GERD, and hx of B/L PE in 2022, BIB by self, referred by outpatient psychiatrist, for suicidal behavior and worsening mood.    On exam, patient is calm and cooperative, Patient reports waking up at 4:00 am this morning to go to work. She states she became hysterical after she believed she her dog was having a seizure. Patient states she then drove to the train station, entered the train and got off at [facility]. Patient states she became upset when she realized that train was taking alternative route due to weekend schedule. Patient states she returned home. After getting off the train, patient decided to walk into the train with the intent to harm herself, but stopped herself because she did not want to inconvenience the .  Patient states that she then went home, attempted to fall asleep, and informed her psychiatrist, who recommended that she be evaluated in the ED. Prior to the last week, patient states she that her mood had been "down", but not as severely depressed as current mood. Patient attributes this psychosocial stressors, which she identifies is primarily due to school/work.    Over the last week, patient reports fluctuations in mood sxs. Patient reports experiencing depressive sxs (amotivation, anergia, poor concentration, passive SI, poor self-care) and reports "bouts of energy" in which she states she has been "listening to music and getting really into it". Patient denies additional symptoms of mayo including decreased need for sleep (reports sleeping 7-8 hrs/night), increased goal-directed activity, grandiosity.  Within the last week, patient states she has been ruminating on different plans to end her life (SA via OD, stabbing herself with machete in the home). Patient endorses vague AH, occurring over the last week, in which she describes "hearing her partner's phone buzzing", "heard her mom screaming", and "heard my partner telling me that my aunt had ". Denies previous hx of perceptual disturbances. Patient denies additional psychotic sxs including IOR, thought insertion/withdrawal, etc. Patient endorses vague hx of paranoia, in which she states she previously was concerned that people were able to see/watch her from her seventh floor apartment. When asked about suicidality, patient discloses that intent of recent suicidal behavior was not performed with the intent to end her life, as patient states she "I didn't want to die, I thought it would injure me to the point of hospitalization" and "I didn't want to be in society". Patient is elusive about status of current suicidality. When asked about current SI, patient states "well there are no cars here". Patient unable to engage in safety planning, as she states "I'll try to", when asked if she believes that she can remain safe, if discharged from the hospital.    Collateral from partner, Dwayne: Reports patient has poor sleep, inconsolably crying over the last several days. States that patient had chronic hx of endorsing SI, but no safety concerns until the last few days, at which time patient's mood sxs became worse. Over the last month, reports that patient has been stressed and anxious due to school and work, recently abruptly took the month off, as a result.

## 2024-08-17 NOTE — ED ADULT TRIAGE NOTE - CHIEF COMPLAINT QUOTE
pt ambulatory, sent by psychiatrist, endorses suicidal ideation without plan, denies homicidal ideation. endorsees auditory hallucinations of screams and frequent nightmares that keep her up at night. denies drug or alcohol use. calm and cooperative at this time. endorsing  dizziness. medical history: pulmonary embolism, Grave's, SVT

## 2024-08-17 NOTE — ED BEHAVIORAL HEALTH ASSESSMENT NOTE - RISK ASSESSMENT
At this time, patient is at elevated risk of harm to self and meets criteria for voluntary hospitalization.    Acute RF: recent suicidal behavior, poor treatment response, active mood symptoms, passive SI  Chronic RF:  hx of SIB, bipolar II disorder  Protective Factors: strong social supports, engaged in outpatient treatment, help-seeking behavior  Protective Factors: At this time, patient is at elevated risk of harm to self and meets criteria for voluntary hospitalization.    Acute RF: recent suicidal behavior, poor treatment response, active mood symptoms, passive SI  Chronic RF:  hx of SIB, bipolar II disorder  Protective Factors: strong social supports, engaged in outpatient treatment, help-seeking behavior

## 2024-08-17 NOTE — ED ADULT NURSE NOTE - OBJECTIVE STATEMENT
Received pt in  pt calm c/o depression with si/ah & insomnia emotional support provided eval on going.

## 2024-08-17 NOTE — ED PROVIDER NOTE - CLINICAL SUMMARY MEDICAL DECISION MAKING FREE TEXT BOX
30 yo F PMH Bipolar, Anxiety, ADHD, OCD, Graves Disease (s/p thyroidectomy, now on Sythroid) and PE (off eliquis, on beta blocker) p/w s/p aborted suicide attempt for jumping in front of a car. Admits she stopped herself because she did not want to inconvenience the  and mess up his car. Patient started don ambien x1 week. Sent by psychiatrist for possible adverse effects? 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 Patient feels overwhelmed with her schedule. Patient also endorses Ah of hearing screams of her neighbor, mom and hears her boyfriend talking when he is actually sleeping. Admits to insomnia since May and poor showering. Reports midsternal chest pain and sob. No chest pain radiation. Denies fever, headache, dizziness, HI/VH, chills, chest pain, shortness of breath, abdominal pain, sick contact or recent travel. Denies alcohol use or other drugs.   Labs, EKG, COVID, Trop, Chest Xray  Wells Criteria for PE low 1.5 score   Psych consult  Dispo as per consult

## 2024-08-17 NOTE — ED BEHAVIORAL HEALTH ASSESSMENT NOTE - SUMMARY
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 Azael Butler via telehealth at [facility], denies hx of violence/legal issues, PMHx of Graves' disease (s/p total thyroidectomy in September 2023), SVT, hyperhydrosis, GERD, and hx of B/L PE in December 2022, BIB by self, referred by outpatient psychiatrist, for suicidal behavior and worsening mood.    Patient presents with symptoms suggestive of an acute mood episode, as evidenced by worsening mood sxs and increased suicidality. Patient is unable to fully engage in safety planning, has access to means (reports having machete in her home), has had poor treatment response to current medication regimen and has multiple non-modifiable chronic risk factors (hx of SIB, chronic of mood d/o), placing this patient at elevated risk of harm. At this time, patient does meet criteria for voluntary hospitalization and will benefit from inpatient hospitalization for safety and stabilization.    PLAN:  1. Admit to 1S on 9.13  2. Safety: no need for CO 1:1, not acutely suicidal/homicidal  3. Psychiatric:  PRNs: Haldol/Ativan/Benadryl IM/PO, prn for agitation   c/w home medications:  - c/w Paxil 12.5mg daily (NON-FORMULARY, PRIMARY TEAM TO F/U)  - c/w Oxtellar 300mg daily  - c/w Zolpidem 10mg HS   4. Medical: PMHx of Graves' disease (s/p total thyroidectomy in September 2023), SVT, hyperhydrosis, GERD, and hx of B/L PE in December 2022  - GERD: pantoprazole 40mg daily (home med of lansoprazole 30mg BID)  - Graves' disease  (s/p total thyroidectomy in September 2023)- synthroid 137mg daily   - hyperhydrosis: glycopyrrolate 2mg BID   - SVT: metoprolol tartate 50mg daily   5. Individual, group, milieu therapy, as directed  6. 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 Azael Butler via telehealth at [facility], denies hx of violence/legal issues, PMHx of Graves' disease (s/p total thyroidectomy in September 2023), SVT, hyperhydrosis, GERD, and hx of B/L PE in December 2022, BIB by self, referred by outpatient psychiatrist, for suicidal behavior and worsening mood.    Patient presents with symptoms suggestive of an acute mood episode, as evidenced by worsening mood sxs and increased suicidality, although underlying Cluster B personality pathology cannot be fully excluded as primary vs secondary diagnosis, Patient is unable to fully engage in safety planning, has access to means (reports having machete in her home), has had poor treatment response to current medication regimen and has multiple non-modifiable chronic risk factors (hx of SIB, chronic of mood d/o), placing this patient at elevated risk of harm. At this time, patient does meet criteria for voluntary hospitalization and will benefit from inpatient hospitalization for safety and stabilization.    PLAN:  1. Admit to 1S on 9.13  2. Safety: no need for CO 1:1, not acutely suicidal/homicidal  3. Psychiatric:  PRNs: Haldol/Ativan/Benadryl IM/PO, prn for agitation   c/w home medications:  - c/w Paxil 12.5mg daily (NON-FORMULARY, PRIMARY TEAM TO F/U)  - c/w Oxtellar 300mg daily  - c/w Zolpidem 10mg HS   4. Medical: PMHx of Graves' disease (s/p total thyroidectomy in September 2023), SVT, hyperhydrosis, GERD, and hx of B/L PE in December 2022  - GERD: pantoprazole 40mg daily (home med of lansoprazole 30mg BID)  - Graves' disease  (s/p total thyroidectomy in September 2023)- synthroid 137mg daily   - hyperhydrosis: glycopyrrolate 2mg BID   - SVT: metoprolol tartate 50mg daily   5. Individual, group, milieu therapy, as directed  6. 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 Azael Butler via telehealth at [facility], denies hx of violence/legal issues, PMHx of Graves' disease (s/p total thyroidectomy in September 2023), SVT, hyperhydrosis, GERD, and hx of B/L PE in December 2022, BIB by self, referred by outpatient psychiatrist, for suicidal behavior and worsening mood.    Patient presents with symptoms suggestive of an acute mood episode, as evidenced by worsening mood sxs and increased suicidality, although underlying Cluster B personality pathology cannot be fully excluded as primary vs secondary diagnosis, Patient is unable to fully engage in safety planning, has access to means (reports having machete in her home), has had poor treatment response to current medication regimen and has multiple non-modifiable chronic risk factors (hx of SIB, chronic of mood d/o), placing this patient at elevated risk of harm. At this time, patient does meet criteria for voluntary hospitalization and will benefit from inpatient hospitalization for safety and stabilization.    PLAN:  1. Admit to 1S on 9.13  2. Safety: no need for CO 1:1, not acutely suicidal/homicidal  3. Psychiatric:  PRNs: Haldol/Ativan/Benadryl IM/PO, prn for agitation   c/w home medications:  - c/w Paxil 12.5mg daily (NON-FORMULARY, PRIMARY TEAM TO F/U)  - c/w Oxtellar 300mg daily (NON-FORMULARY, PRIMARY TEAM TO F/U)  - c/w Zolpidem 10mg HS (I-STOP Reference #: [id number])  4. Medical: PMHx of Graves' disease (s/p total thyroidectomy in September 2023), SVT, hyperhydrosis, GERD, and hx of B/L PE in December 2022  - GERD: pantoprazole 40mg daily (home med of lansoprazole 30mg BID)  - Graves' disease  (s/p total thyroidectomy in September 2023)- synthroid 137mg daily   - hyperhydrosis: glycopyrrolate 2mg BID   - SVT: metoprolol tartate 50mg daily   5. Individual, group, milieu therapy, as directed  6. 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 Azael Butler via telehealth at [facility], denies hx of violence/legal issues, PMHx of Graves' disease (s/p total thyroidectomy in September 2023), SVT, hyperhydrosis, GERD, and hx of B/L PE in December 2022, BIB by self, referred by outpatient psychiatrist, for suicidal behavior and worsening mood.    Patient presents with symptoms suggestive of an acute mood episode, as evidenced by worsening mood sxs and increased suicidality, although underlying Cluster B personality pathology cannot be fully excluded as primary vs secondary diagnosis, Patient is unable to fully engage in safety planning, has access to means (reports having machete in her home), has had poor treatment response to current medication regimen and has multiple non-modifiable chronic risk factors (hx of SIB, chronic of mood d/o), placing this patient at elevated risk of harm. At this time, patient does meet criteria for voluntary hospitalization and will benefit from inpatient hospitalization for safety and stabilization.    PLAN:  1. Admit to 1S on 9.13  2. Safety: no need for CO 1:1, not acutely suicidal/homicidal  3. Psychiatric:  PRNs: Haldol/Ativan/Benadryl IM/PO, prn for agitation   c/w home medications:  - c/w Paxil ER 12.5mg daily (NON-FORMULARY, PRIMARY TEAM TO F/U)  - c/w Oxtellar 300mg daily (NON-FORMULARY, PRIMARY TEAM TO F/U)  - c/w Zolpidem 10mg HS (I-STOP Reference #: [id number])  4. Medical: PMHx of Graves' disease (s/p total thyroidectomy in September 2023), SVT, hyperhydrosis, GERD, and hx of B/L PE in December 2022  - GERD: pantoprazole 40mg daily (home med of lansoprazole 30mg BID)  - Graves' disease  (s/p total thyroidectomy in September 2023)- synthroid 137mg daily   - hyperhydrosis: glycopyrrolate 2mg BID   - SVT: metoprolol tartate 50mg daily   5. Individual, group, milieu therapy, as directed  6. 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 Azael Butler via telehealth at [facility] and biweekly psychotherapy with Giselle at [facility], denies hx of violence/legal issues, PMHx of Graves' disease (s/p total thyroidectomy in September 2023), SVT, hyperhydrosis, GERD, and hx of B/L PE in December 2022, BIB by self, referred by outpatient psychiatrist, for suicidal behavior and worsening mood.    Patient presents with symptoms suggestive of an acute mood episode, as evidenced by worsening mood sxs and suicidality, although underlying Cluster B personality pathology cannot be fully excluded as primary vs secondary diagnosis. Patient is unable to fully engage in safety planning, has access to means (reports having machete in her home), has had poor treatment response to current medication regimen and has multiple non-modifiable chronic risk factors (hx of SIB, chronic of mood d/o), placing this patient at elevated risk of harm. At this time, patient does meet criteria for voluntary hospitalization and will benefit from inpatient hospitalization for safety and stabilization.    PLAN:  1. Admit to 1S on 9.13  2. Safety: no need for CO 1:1, not acutely suicidal/homicidal  3. Psychiatric:  PRNs: Haldol/Ativan/Benadryl IM/PO, prn for agitation   c/w home medications:  - c/w Paxil ER 12.5mg daily (NON-FORMULARY, PRIMARY TEAM TO F/U)  - c/w Oxtellar 300mg daily (NON-FORMULARY, PRIMARY TEAM TO F/U)  - c/w Zolpidem 10mg HS (I-STOP Reference #: [id number])  4. Medical: PMHx of Graves' disease (s/p total thyroidectomy in September 2023), SVT, hyperhydrosis, GERD, and hx of B/L PE in December 2022  - GERD: pantoprazole 40mg daily (home med of lansoprazole 30mg BID)  - Graves' disease  (s/p total thyroidectomy in September 2023)- synthroid 137mg daily   - hyperhydrosis: glycopyrrolate 2mg BID   - SVT: metoprolol tartate 50mg daily   5. Individual, group, milieu therapy, as directed  6. Dispo: pending clinical improvement

## 2024-08-17 NOTE — ED PROVIDER NOTE - NSICDXPASTMEDICALHX_GEN_ALL_CORE_FT
PAST MEDICAL HISTORY:  Acid reflux     Graves disease     Hyperthyroidism     Pulmonary embolism

## 2024-08-17 NOTE — ED BEHAVIORAL HEALTH ASSESSMENT NOTE - ADDITIONAL DETAILS ALL
sents to the ED in the context of suicidal gesture, denies intent was to end her life, but reports that she wanted to cause bodily injury to herself sent to the ED in the context of suicidal gesture, denies intent was to end her life, but reports that she wanted to cause bodily injury to herself

## 2024-08-17 NOTE — ED PROVIDER NOTE - NSFOLLOWUPINSTRUCTIONS_ED_ALL_ED_FT
Follow up with your PMD within 48-72 hrs. Show copies of your reports given to you.   Worsening, continued or new concerning symptoms return to the emergency department.    You have been given information necessary to follow up with the  [facility] ([facility]) Crisis center & other outpatient  psychiatric clinics within your community    • [facility] walk in Crisis centre  [address] [place] [address]  [phone] [email or web address]  Hours of operation:  Day	                                        Hours  Sunday                                  Closed  Monday                                9am - 3pm  Tuesday                                9am - 3pm  Wednesday                          9am - 3pm  Thursday                               9am - 3pm  Friday                                    9am - 3pm  Saturday                                Closed

## 2024-08-17 NOTE — ED BEHAVIORAL HEALTH ASSESSMENT NOTE - DESCRIPTION
In the ED, patient has been in fair behavorial control. 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 Did not require PRN medication for agitation. No use of 4 or 5 point restraints    ICU Vital Signs Last 24 Hrs  T(C): 36.8 (17 Aug 2024 18:17), Max: 36.8 (17 Aug 2024 18:17)  T(F): 98.3 (17 Aug 2024 18:17), Max: 98.3 (17 Aug 2024 18:17)  HR: 62 (17 Aug 2024 18:17) (62 - 62)  BP: 117/79 (17 Aug 2024 18:17) (117/79 - 117/79)  BP(mean): --  ABP: --  ABP(mean): --  RR: 14 (17 Aug 2024 18:17) (14 - 14)  SpO2: 97% (17 Aug 2024 18:17) (97% - 97%)    O2 Parameters below as of 17 Aug 2024 18:17  Patient On (Oxygen Delivery Method): room air PMHx: Graves' disease (s/p total thyroidectomy in September 2023), SVT, hyperhydrosis, GERD, and hx of B/L PE in December 2022,

## 2024-08-17 NOTE — ED BEHAVIORAL HEALTH ASSESSMENT NOTE - PAST PSYCHOTROPIC MEDICATION
Reports previous trials with Rexulti, multiple stimulants for ADHD, gabapentin, trazodone, doxepin
24-Nov-2022 22:23

## 2024-08-17 NOTE — ED BEHAVIORAL HEALTH ASSESSMENT NOTE - NSSUICPROTFACT_PSY_ALL_CORE
Responsibility to children, family, or others/Identifies reasons for living/Supportive social network of family or friends/Engaged in work or school/Positive therapeutic relationships/Beloved pets/Other

## 2024-08-17 NOTE — ED ADULT NURSE NOTE - NSFALLUNIVINTERV_ED_ALL_ED
Bed/Stretcher in lowest position, wheels locked, appropriate side rails in place/Call bell, personal items and telephone in reach/Instruct patient to call for assistance before getting out of bed/chair/stretcher/Non-slip footwear applied when patient is off stretcher/[place] to call system/Physically safe environment - no spills, clutter or unnecessary equipment/Purposeful proactive rounding/Room/bathroom lighting operational, light cord in reach Bed/Stretcher in lowest position, wheels locked, appropriate side rails in place/Instruct patient to call for assistance before getting out of bed/chair/stretcher/Non-slip footwear applied when patient is off stretcher/Physically safe environment - no spills, clutter or unnecessary equipment/Purposeful proactive rounding

## 2024-08-18 PROBLEM — E05.00 THYROTOXICOSIS WITH DIFFUSE GOITER WITHOUT THYROTOXIC CRISIS OR STORM: Chronic | Status: ACTIVE | Noted: 2024-07-25

## 2024-08-18 PROCEDURE — 99222 1ST HOSP IP/OBS MODERATE 55: CPT

## 2024-08-18 RX ORDER — OXCARBAZEPINE 300 MG/1
300 TABLET, FILM COATED ORAL
Refills: 0 | Status: DISCONTINUED | OUTPATIENT
Start: 2024-08-18 | End: 2024-08-18

## 2024-08-18 RX ORDER — METOPROLOL TARTRATE 100 MG/1
50 TABLET ORAL ONCE
Refills: 0 | Status: COMPLETED | OUTPATIENT
Start: 2024-08-18 | End: 2024-08-18

## 2024-08-18 RX ORDER — PAROXETINE 10 MG/1
10 TABLET, FILM COATED ORAL DAILY
Refills: 0 | Status: DISCONTINUED | OUTPATIENT
Start: 2024-08-18 | End: 2024-08-18

## 2024-08-18 RX ORDER — POLYETHYLENE GLYCOL 3350 17 G/17G
17 POWDER, FOR SOLUTION ORAL DAILY
Refills: 0 | Status: DISCONTINUED | OUTPATIENT
Start: 2024-08-18 | End: 2024-08-22

## 2024-08-18 RX ORDER — PAROXETINE 10 MG/1
10 TABLET, FILM COATED ORAL AT BEDTIME
Refills: 0 | Status: DISCONTINUED | OUTPATIENT
Start: 2024-08-18 | End: 2024-08-19

## 2024-08-18 RX ORDER — OXCARBAZEPINE 300 MG/1
300 TABLET, FILM COATED ORAL
Refills: 0 | Status: DISCONTINUED | OUTPATIENT
Start: 2024-08-18 | End: 2024-08-19

## 2024-08-18 RX ADMIN — Medication 40 MILLIGRAM(S): at 08:48

## 2024-08-18 RX ADMIN — Medication 5 MILLIGRAM(S): at 03:00

## 2024-08-18 RX ADMIN — ZOLPIDEM TARTRATE 5 MILLIGRAM(S): 5 TABLET, FILM COATED ORAL at 21:00

## 2024-08-18 RX ADMIN — GLYCOPYRROLATE 2 MILLIGRAM(S): 0.2 INJECTION INTRAMUSCULAR; INTRAVENOUS at 08:49

## 2024-08-18 RX ADMIN — Medication 50 MILLIGRAM(S): at 03:00

## 2024-08-18 RX ADMIN — METOPROLOL TARTRATE 50 MILLIGRAM(S): 100 TABLET ORAL at 08:48

## 2024-08-18 RX ADMIN — ZOLPIDEM TARTRATE 5 MILLIGRAM(S): 5 TABLET, FILM COATED ORAL at 02:08

## 2024-08-18 RX ADMIN — LORAZEPAM 2 MILLIGRAM(S): 4 INJECTION INTRAMUSCULAR; INTRAVENOUS at 11:58

## 2024-08-18 RX ADMIN — PAROXETINE 10 MILLIGRAM(S): 10 TABLET, FILM COATED ORAL at 21:00

## 2024-08-18 RX ADMIN — Medication 137 MICROGRAM(S): at 12:04

## 2024-08-18 RX ADMIN — GLYCOPYRROLATE 2 MILLIGRAM(S): 0.2 INJECTION INTRAMUSCULAR; INTRAVENOUS at 20:33

## 2024-08-18 RX ADMIN — POLYETHYLENE GLYCOL 3350 17 GRAM(S): 17 POWDER, FOR SOLUTION ORAL at 13:29

## 2024-08-18 RX ADMIN — OXCARBAZEPINE 300 MILLIGRAM(S): 300 TABLET, FILM COATED ORAL at 21:01

## 2024-08-18 RX ADMIN — Medication 30 MILLILITER(S): at 13:29

## 2024-08-18 NOTE — BH INPATIENT PSYCHIATRY ASSESSMENT NOTE - OTHER PAST PSYCHIATRIC HISTORY (INCLUDE DETAILS REGARDING ONSET, COURSE OF ILLNESS, INPATIENT/OUTPATIENT TREATMENT)
no previous IP hospitalizations, in outpatient psychiatric treatment with Dr. Azael Butler at [facility], in psychotherapy with Kyra at [facility] (seen bi-weekly), no previous hx of SA, remote hx of SIB (scratching-last performed in 2018)

## 2024-08-18 NOTE — BH INPATIENT PSYCHIATRY ASSESSMENT NOTE - CURRENT MEDICATION
MEDICATIONS  (STANDING):  glycopyrrolate 2 milliGRAM(s) Oral two times a day  levothyroxine 137 MICROGram(s) Oral daily  metoprolol tartrate 50 milliGRAM(s) Oral daily  pantoprazole    Tablet 40 milliGRAM(s) Oral before breakfast    MEDICATIONS  (PRN):  diphenhydrAMINE 50 milliGRAM(s) Oral every 6 hours PRN EPS ppx  diphenhydrAMINE Injectable 50 milliGRAM(s) IntraMuscular once PRN Severe Agitation  haloperidol     Tablet 5 milliGRAM(s) Oral every 6 hours PRN Agitation  haloperidol    Injectable 5 milliGRAM(s) IntraMuscular Once PRN Severe Agitation  LORazepam     Tablet 2 milliGRAM(s) Oral every 6 hours PRN Anxiety  LORazepam   Injectable 2 milliGRAM(s) IntraMuscular Once PRN Severe Agitation  zolpidem 5 milliGRAM(s) Oral at bedtime PRN Insomnia  zolpidem 5 milliGRAM(s) Oral at bedtime PRN Insomnia   MEDICATIONS  (STANDING):  glycopyrrolate 2 milliGRAM(s) Oral two times a day  levothyroxine 137 MICROGram(s) Oral daily  metoprolol tartrate 50 milliGRAM(s) Oral daily  OXcarbazepine 300 milliGRAM(s) Oral <User Schedule>  pantoprazole    Tablet 40 milliGRAM(s) Oral before breakfast  PARoxetine 10 milliGRAM(s) Oral daily    MEDICATIONS  (PRN):  diphenhydrAMINE 50 milliGRAM(s) Oral every 6 hours PRN EPS ppx  diphenhydrAMINE Injectable 50 milliGRAM(s) IntraMuscular once PRN Severe Agitation  haloperidol     Tablet 5 milliGRAM(s) Oral every 6 hours PRN Agitation  haloperidol    Injectable 5 milliGRAM(s) IntraMuscular Once PRN Severe Agitation  LORazepam     Tablet 2 milliGRAM(s) Oral every 6 hours PRN Anxiety  LORazepam   Injectable 2 milliGRAM(s) IntraMuscular Once PRN Severe Agitation  zolpidem 5 milliGRAM(s) Oral at bedtime PRN Insomnia  zolpidem 5 milliGRAM(s) Oral at bedtime PRN Insomnia

## 2024-08-18 NOTE — BH INPATIENT PSYCHIATRY ASSESSMENT NOTE - HPI (INCLUDE ILLNESS QUALITY, SEVERITY, DURATION, TIMING, CONTEXT, MODIFYING FACTORS, ASSOCIATED SIGNS AND SYMPTOMS)
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 Azael Butler via telehealth at [facility] and biweekly psychotherapy with Giselle at [facility], denies hx of violence/legal issues, PMHx of Graves' disease (s/p total thyroidectomy in 2023), SVT, hyperhydrosis, GERD, and hx of B/L PE in 2022, BIB by self to [facility] ED on 24, referred by outpatient psychiatrist, for suicidal behavior and worsening mood. In the ED, Patient calm and cooperative, reported waking up at 4:00 am this morning to go to work, became hysterical after she believed she her dog was having a seizure. Patient states she then drove to the train station, entered the train and got off at [facility]. Patient states she became upset when she realized that train was taking alternative route due to weekend schedule. Patient states she returned home. After getting off the train, patient decided to walk into the train with the intent to harm herself, but stopped herself because she did not want to inconvenience the .  Patient states that she then went home, attempted to fall asleep, and informed her psychiatrist, who recommended that she be evaluated in the ED. Prior to the last week, patient states she that her mood had been "down", but not as severely depressed as current mood. Patient attributes this psychosocial stressors, which she identifies is primarily due to school/work.    Over the last week, patient reports fluctuations in mood sxs. Patient reports experiencing depressive sxs (amotivation, anergia, poor concentration, passive SI, poor self-care) and reports "bouts of energy" in which she states she has been "listening to music and getting really into it". Patient denies additional symptoms of mayo including decreased need for sleep (reports sleeping 7-8 hrs/night), increased goal-directed activity, grandiosity.  Within the last week, patient states she has been ruminating on different plans to end her life (SA via OD, stabbing herself with machete in the home). Patient endorses vague AH, occurring over the last week, in which she describes "hearing her partner's phone buzzing", "heard her mom screaming", and "heard my partner telling me that my aunt had ". Denies previous hx of perceptual disturbances. Patient denies additional psychotic sxs including IOR, thought insertion/withdrawal, etc. Patient endorses vague hx of paranoia, in which she states she previously was concerned that people were able to see/watch her from her seventh floor apartment. When asked about suicidality, patient discloses that intent of recent suicidal behavior was not performed with the intent to end her life, as patient states she "I didn't want to die, I thought it would injure me to the point of hospitalization" and "I didn't want to be in society". Patient is elusive about status of current suicidality. When asked about current SI, patient states "well there are no cars here". Patient unable to engage in safety planning, as she states "I'll try to", when asked if she believes that she can remain safe, if discharged from the hospital.    Collateral from partner, Dwayne: Reports patient has poor sleep, inconsolably crying over the last several days. States that patient had chronic hx of endorsing SI, but no safety concerns until the last few days, at which time patient's mood sxs became worse. Over the last month, reports that patient has been stressed and anxious due to school and work, recently abruptly took the month off, as a result.    ON UNIT 1S: calm, cooperative, dressed in regular clothing, just showered and brushed teeth. reports that she did not sleep well last night, got the medications "too late" and she baseline her poor sleep due to frequent awakenings due to dreams. reports low mood, anxiousness, feels safe on the Unit. Ate breakfast and went for AM group.  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 Azael Butler via telehealth at [facility] and biweekly psychotherapy with Giselle at [facility], denies hx of violence/legal issues, PMHx of Graves' disease (s/p total thyroidectomy in 2023), SVT, hyperhydrosis, GERD, and hx of B/L PE in 2022, BIB by self to [facility] ED on 24, referred by outpatient psychiatrist, for suicidal behavior and worsening mood. In the ED, Patient calm and cooperative, reported waking up at 4:00 am this morning to go to work, became hysterical after she believed she her dog was having a seizure. Patient states she then drove to the train station, entered the train and got off at [facility]. Patient states she became upset when she realized that train was taking alternative route due to weekend schedule. Patient states she returned home. After getting off the train, patient decided to walk into the train with the intent to harm herself, but stopped herself because she did not want to inconvenience the .  Patient states that she then went home, attempted to fall asleep, and informed her psychiatrist, who recommended that she be evaluated in the ED. Prior to the last week, patient states she that her mood had been "down", but not as severely depressed as current mood. Patient attributes this psychosocial stressors, which she identifies is primarily due to school/work.    Over the last week, patient reports fluctuations in mood sxs. Patient reports experiencing depressive sxs (amotivation, anergia, poor concentration, passive SI, poor self-care) and reports "bouts of energy" in which she states she has been "listening to music and getting really into it". Patient denies additional symptoms of mayo including decreased need for sleep (reports sleeping 7-8 hrs/night), increased goal-directed activity, grandiosity.  Within the last week, patient states she has been ruminating on different plans to end her life (SA via OD, stabbing herself with machete in the home). Patient endorses vague AH, occurring over the last week, in which she describes "hearing her partner's phone buzzing", "heard her mom screaming", and "heard my partner telling me that my aunt had ". Denies previous hx of perceptual disturbances. Patient denies additional psychotic sxs including IOR, thought insertion/withdrawal, etc. Patient endorses vague hx of paranoia, in which she states she previously was concerned that people were able to see/watch her from her seventh floor apartment. When asked about suicidality, patient discloses that intent of recent suicidal behavior was not performed with the intent to end her life, as patient states she "I didn't want to die, I thought it would injure me to the point of hospitalization" and "I didn't want to be in society". Patient is elusive about status of current suicidality. When asked about current SI, patient states "well there are no cars here". Patient unable to engage in safety planning, as she states "I'll try to", when asked if she believes that she can remain safe, if discharged from the hospital.    Collateral from partner, Dwayne: Reports patient has poor sleep, inconsolably crying over the last several days. States that patient had chronic hx of endorsing SI, but no safety concerns until the last few days, at which time patient's mood sxs became worse. Over the last month, reports that patient has been stressed and anxious due to school and work, recently abruptly took the month off, as a result.    ON UNIT 1S: calm, cooperative, dressed in regular clothing, just showered and brushed teeth. reports that she did not sleep well last night, got the medications "too late" and she baseline her poor sleep due to frequent awakenings due to dreams. reports low mood, anxiousness, feels safe on the Unit. Ate breakfast and went for AM group.     ISTOP| Reference #: [id number]  2024	zolpidem tartrate 10 mg tablet	30	30	Simon Renae MD	[id number]	Insurance	[facility] #[id number]  2024	dexmethylphenidate er 10 mg cp	60	30	Khris Martinez	[id number]	Insurance	[facility] #[id number]  2024	dexmethylphenidate er 10 mg cp	15	15	Khris Martinez	[id number]	Insurance	[facility] #[id number]  10/21/2023	10/24/2023	azstarys 39.2 mg-7.8 mg cap	30	30	Mehul Ennis	[id number]	Insurance	[facility] #[id number]  2023	oxycodone hcl (ir) 5 mg tablet	12	2	Franco Ramsey [facility]	[id number]	Insurance	[facility] #[id number]  2023	azstarys 39.2 mg-7.8 mg cap	30	30	Mehul Ennis	[id number]	[facility] #[id number]

## 2024-08-18 NOTE — BH INPATIENT PSYCHIATRY ASSESSMENT NOTE - NSBHMETABOLIC_PSY_ALL_CORE_FT
BMI: BMI (kg/m2): 33.2 (07-24-24 @ 18:09)  HbA1c:   Glucose:   BP: 118/61 (08-18-24 @ 00:18) (117/79 - 118/61)Vital Signs Last 24 Hrs  T(C): 36.7 (08-18-24 @ 00:45), Max: 36.8 (08-17-24 @ 18:17)  T(F): 98 (08-18-24 @ 00:45), Max: 98.3 (08-17-24 @ 18:17)  HR: 67 (08-18-24 @ 00:18) (62 - 67)  BP: 118/61 (08-18-24 @ 00:18) (117/79 - 118/61)  BP(mean): --  RR: 18 (08-18-24 @ 00:45) (14 - 18)  SpO2: 100% (08-18-24 @ 00:45) (97% - 100%)    Orthostatic VS  08-18-24 @ 00:45  Lying BP: --/-- HR: --  Sitting BP: 115/74 HR: 63  Standing BP: 123/82 HR: 66  Site: --  Mode: --    Lipid Panel:

## 2024-08-18 NOTE — BH INPATIENT PSYCHIATRY ASSESSMENT NOTE - DETAILS
Patient presents to the ED in the context of suicidal gesture, denies intent was to end her life, but reports that she wanted to cause bodily injury to herself

## 2024-08-18 NOTE — BH INPATIENT PSYCHIATRY ASSESSMENT NOTE - NSBHCHARTREVIEWVS_PSY_A_CORE FT
Vital Signs Last 24 Hrs  T(C): 36.7 (08-18-24 @ 00:45), Max: 36.8 (08-17-24 @ 18:17)  T(F): 98 (08-18-24 @ 00:45), Max: 98.3 (08-17-24 @ 18:17)  HR: 67 (08-18-24 @ 00:18) (62 - 67)  BP: 118/61 (08-18-24 @ 00:18) (117/79 - 118/61)  BP(mean): --  RR: 18 (08-18-24 @ 00:45) (14 - 18)  SpO2: 100% (08-18-24 @ 00:45) (97% - 100%)    Orthostatic VS  08-18-24 @ 00:45  Lying BP: --/-- HR: --  Sitting BP: 115/74 HR: 63  Standing BP: 123/82 HR: 66  Site: --  Mode: --

## 2024-08-18 NOTE — PSYCHIATRIC REHAB INITIAL EVALUATION - NSBHSTRENGTHHOME_PSY_ALL_CORE
Confirmed echo appointment with patient and reviewed the COVID pre procedure checklist.
Home is a safe space for her where she has an office to herself and her pets are.

## 2024-08-18 NOTE — BH PATIENT PROFILE - FALL HARM RISK - UNIVERSAL INTERVENTIONS
Instruct patient to call for assistance before getting out of bed or chair/Non-slip footwear when patient is out of bed/[place] to call system/Physically safe environment - no spills, clutter or unnecessary equipment/Purposeful Proactive Rounding/Room/bathroom lighting operational, light cord in reach

## 2024-08-18 NOTE — BH INPATIENT PSYCHIATRY ASSESSMENT NOTE - NSBHASSESSSUMMFT_PSY_ALL_CORE
suspecting Borderline Personality Disorder  to be the actual working diagnosis in this case  -  suspecting Borderline Personality Disorder  to be the actual working diagnosis in this case  - hx of PE but not currently on anticoagulation; completed 6-month course as per records  - on Synthroid 137 mcg PO qd for Grave's dz (s/p thyroidectomy)  - nurse reported that Pt's boyfriend called the Unit saying that she is on a seizure medication; chart reviewed and there is no record of Pt ever having seizures but was given Trileptal 300mg PO x 1 dose in the Ed yesterday for "mood disorder." Ordered it  suspecting Borderline Personality Disorder  to be the actual working diagnosis in this case  - hx of PE but not currently on anticoagulation; completed 6-month course as per records  - on Synthroid 137 mcg PO qd for Grave's dz (s/p thyroidectomy)  - nurse reported that Pt's boyfriend called the Unit saying that she is on a seizure medication; chart reviewed and there is no record of Pt ever having seizures but was given Trileptal 300mg PO x 1 dose in the Ed yesterday for "mood disorder." Ordered it   - Paxil 12.5mg CR theraputic exchange of Paxil 10mg po qd

## 2024-08-18 NOTE — BH INPATIENT PSYCHIATRY ASSESSMENT NOTE - ADDITIONAL DETAILS ALL
sent to the ED in the context of suicidal gesture, denies intent was to end her life, but reports that she wanted to cause bodily injury to herself

## 2024-08-18 NOTE — BH INPATIENT PSYCHIATRY ASSESSMENT NOTE - RISK ASSESSMENT
At this time, patient is at elevated risk of harm to self and meets criteria for voluntary hospitalization. Acute RF: recent suicidal behavior, poor treatment response, active mood symptoms, passive SI. Chronic RF:  hx of SIB, bipolar II disorder. Protective Factors: strong social supports, engaged in outpatient treatment, help-seeking behavior

## 2024-08-18 NOTE — BH PATIENT PROFILE - NSPROPTRIGHTNOTIFY_GEN_A_NUR
declines Patient Specific Counseling (Will Not Stick From Patient To Patient): \\nI offered to treat SKs with LN2 which patient declined. Detail Level: Zone Detail Level: Generalized Detail Level: Detailed

## 2024-08-18 NOTE — PSYCHIATRIC REHAB INITIAL EVALUATION - NSBHPRRECOMMEND_PSY_ALL_CORE
Writer met with patient to orient patient to unit 1S as well as the role/function of activities specialists and inpatient psychiatric rehabilitation programming. Patient demonstrated full engagement with writer during initial session, presenting as appropriate dressed and well-groomed with a depressed mood. Patient was able to identify an appropriate rehabilitation goal within the context of presenting symptoms. Patient rehabilitation goal is to take all medications as prescribed. Psychiatric rehabilitation staff will meet with patient weekly to review progress towards goal.

## 2024-08-19 RX ORDER — SERTRALINE HYDROCHLORIDE 50 MG/1
25 TABLET, FILM COATED ORAL DAILY
Refills: 0 | Status: DISCONTINUED | OUTPATIENT
Start: 2024-08-19 | End: 2024-08-20

## 2024-08-19 RX ORDER — TRAZODONE HCL 50 MG
100 TABLET ORAL AT BEDTIME
Refills: 0 | Status: DISCONTINUED | OUTPATIENT
Start: 2024-08-19 | End: 2024-08-21

## 2024-08-19 RX ADMIN — GLYCOPYRROLATE 2 MILLIGRAM(S): 0.2 INJECTION INTRAMUSCULAR; INTRAVENOUS at 20:49

## 2024-08-19 RX ADMIN — Medication 137 MICROGRAM(S): at 06:10

## 2024-08-19 RX ADMIN — Medication 40 MILLIGRAM(S): at 09:21

## 2024-08-19 RX ADMIN — Medication 100 MILLIGRAM(S): at 20:49

## 2024-08-19 RX ADMIN — GLYCOPYRROLATE 2 MILLIGRAM(S): 0.2 INJECTION INTRAMUSCULAR; INTRAVENOUS at 09:22

## 2024-08-19 NOTE — BH INPATIENT PSYCHIATRY PROGRESS NOTE - NSBHCHARTREVIEWVS_PSY_A_CORE FT
Vital Signs Last 24 Hrs  T(C): 36.9 (08-19-24 @ 08:27), Max: 36.9 (08-19-24 @ 08:27)  T(F): 98.5 (08-19-24 @ 08:27), Max: 98.5 (08-19-24 @ 08:27)  HR: --  BP: --  BP(mean): --  RR: --  SpO2: --    Orthostatic VS  08-19-24 @ 08:27  Lying BP: --/-- HR: --  Sitting BP: 114/61 HR: 82  Standing BP: 109/71 HR: 93  Site: upper left arm  Mode: electronic  Orthostatic VS  08-18-24 @ 00:45  Lying BP: --/-- HR: --  Sitting BP: 115/74 HR: 63  Standing BP: 123/82 HR: 66  Site: --  Mode: --   Vital Signs Last 24 Hrs  T(C): 36.5 (08-19-24 @ 20:52), Max: 36.9 (08-19-24 @ 08:27)  T(F): 97.7 (08-19-24 @ 20:52), Max: 98.5 (08-19-24 @ 08:27)  HR: --  BP: --  BP(mean): --  RR: --  SpO2: --    Orthostatic VS  08-19-24 @ 08:27  Lying BP: --/-- HR: --  Sitting BP: 114/61 HR: 82  Standing BP: 109/71 HR: 93  Site: upper left arm  Mode: electronic

## 2024-08-19 NOTE — BH INPATIENT PSYCHIATRY PROGRESS NOTE - NSCGISEVERILLNESS_PSY_ALL_CORE
independent 6 = Severely ill - disruptive pathology, behavior and function are frequently influenced by symptoms, may require assistance from others

## 2024-08-19 NOTE — BH INPATIENT PSYCHIATRY PROGRESS NOTE - NSBHASSESSSUMMFT_PSY_ALL_CORE
Patient is a 29 year old female with past psychiatric history of Bipolar II Disorder and OCD with no previous inpatient psychiatric hospitalization, denies hx of SA, remote self harm (last skin scratching in 2018), denies legal and substance use hx, BIB self to ED due to suicidal ideations. Patient resides with partner of 8 years and pet dog. 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 Patient endorses one week history of suicidal ideation with the intention to jump in front of a car, difficulty sleeping and fluctuations in mood.     Diagnostic considerations on admit: Given patient presentation, she is being evaluated for Borderline disorder vs     Since admission to 1S: patient has been participating in individual and group therapy    Current plan:      Plan:   -Legal: admit to 1S on 9.13 Voluntary  -Safety: -Appropriate for routine checks, not expected to pose danger to self or others in controlled inpatient setting    #Psychiatric:  -Psychosis or mood:   - Paxil 12.5mg CR theraputic exchange of Paxil 10mg po qd   -Sleep:    -Agitation  PRNS: Haldol 5mg, Ativan 2mg, Benadryl 50mg PO/IM PRN for agitation    - I/G/M therapy  #Medical: Graves, Hypothyroidism, PE  - on Synthroid 137 mcg PO qd for Grave's dz (s/p thyroidectomy)    Admit labs reviewed: CBC, CMP, TSH, Utox, UA, HCg: negative, Covid/RVP:      A1c:, Lipids:   Imaging:   EKG: admit EKG reviewed: From Date? Rate:  NSR?, QTC:     #Dispo:  pending stabilization   #collateral:     suspecting Borderline Personality Disorder  to be the actual working diagnosis in this case  - hx of PE but not currently on anticoagulation; completed 6-month course as per records  -   - nurse reported that Pt's boyfriend called the Unit saying that she is on a seizure medication; chart reviewed and there is no record of Pt ever having seizures but was given Trileptal 300mg PO x 1 dose in the Ed yesterday for "mood disorder." Ordered it    Patient is a 29 year old female with past psychiatric history of Bipolar II Disorder and OCD with no previous inpatient psychiatric hospitalization, denies hx of SA, remote self harm (last skin scratching in 2018), denies legal and substance use hx, BIB self to ED due to suicidal ideations. Patient resides with partner of 8 years and pet dog. 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 Patient endorses one week history of suicidal ideation with the intention to jump in front of a car, difficulty sleeping and fluctuations in mood.     Diagnostic considerations on admit: Given patient presentation, she is being evaluated for Borderline disorder vs     Since admission to 1S: patient has been participating in individual and group therapy    Current plan:      Plan:   -Legal: admit to  on 9.13 Voluntary  -Safety: -Appropriate for routine checks, not expected to pose danger to self or others in controlled inpatient setting    #Psychiatric:  -Psychosis or mood:   - Discontinue Paxil 12.5mg CR therapeutic exchange of Paxil 10mg po qd  - Add Trazadone 100mg Qhs  - Add Zoloft 25mg daily      -Sleep:    -Agitation  PRNS: Haldol 5mg, Ativan 2mg, Benadryl 50mg PO/IM PRN for agitation, Zolpidem 5mg for sleep    - I/G/M therapy  #Medical: Graves, Hypothyroidism, PE  - on Synthroid 137 mcg PO qd for Grave's dz (s/p thyroidectomy)  - Glycopyrrolate 2mg BID  - Discontinue Oxcarbazepine 300mg    Admit labs reviewed: CBC, CMP, TSH, Utox (-), UA, HCg: negative, Covid/RVP:negative      A1c:, Lipids:   Imaging:   EKG: admit EKG reviewed: From Date 8/17/24 Rate:  NSR 66, QTC: 51    #Dispo:  pending stabilization   #collateral:     suspecting Borderline Personality Disorder  to be the actual working diagnosis in this case  - hx of PE but not currently on anticoagulation; completed 6-month course as per records  -   - nurse reported that Pt's boyfriend called the Unit saying that she is on a seizure medication; chart reviewed and there is no record of Pt ever having seizures but was given Trileptal 300mg PO x 1 dose in the Ed yesterday for "mood disorder." Ordered it    Patient is a 29 year old female with past psychiatric history of Bipolar II Disorder and OCD with no previous inpatient psychiatric hospitalization, denies hx of SA, remote self harm (last skin scratching in 2018), denies legal and substance use hx, BIB self to ED due to suicidal ideations. Patient resides with partner of 8 years and pet dog. 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 Patient endorses one week history of suicidal ideation with the intention to jump in front of a car, difficulty sleeping and fluctuations in mood. 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     Diagnostic considerations on admit: Given patient presentation, she is being evaluated for Borderline disorder vs JOHN vs OCD    Since admission to : patient has been participating in individual and group therapy     Plan:   -Legal: admit to  on 9.13 Voluntary, pt meets criteria for, and is expected to benefit from voluntary inpatient hosp.   -Safety: -Appropriate for routine checks, not expected to pose danger to self or others in controlled inpatient setting  #Psychiatric: focus on reducing complexity of meds to avoid interactions  -dc Trileptal 2/2 questionable utility  - Discontinue Paxil 12.5mg CR therapeutic exchange of Paxil 10mg po qd (some concern that paxil HS admin may be contributing to sleep issues)  - Add Trazadone 100mg Qhs for insomnia  - Add Zoloft 25mg daily for anxiety/OCD   -Agitation  PRNS: Haldol 5mg, Ativan 2mg, Benadryl 50mg PO/IM PRN for agitation, Zolpidem 5mg for sleep    - I/G/M therapy  #Medical: Graves, Hypothyroidism, PE  - on Synthroid 137 mcg PO qd for Grave's dz (s/p thyroidectomy)  - Glycopyrrolate 2mg BID  - hold metoprolol for now, will discuss w hospitalist.   Admit labs reviewed: CBC, CMP, TSH, Utox (-), UA, HCg: negative, Covid/RVP:negative   EKG: admit EKG reviewed: From Date 8/17/24 Rate:  NSR 66, QTC: 417ms  #Dispo:  pending stabilization   #collateral: obtained from boyfriend today.   suspecting Borderline Personality Disorder  to be the actual working diagnosis in this case  - hx of PE but not currently on anticoagulation; completed 6-month course as per records

## 2024-08-19 NOTE — PHARMACOTHERAPY INTERVENTION NOTE - COMMENTS
Concurrent use of LEVOTHYROXINE, ANTACIDS (e.g., magnesium and aluminum), and SIMETHICONE may result in decreased levothyroxine effectiveness.    Added administration instruction to PRN order for magnesium hydroxide suspension to "Do not administer within 4 hours of levothyroxine."    Regina Chu, AkhilD, BCPP  
Concurrent administration of levothyroxine and other medications within 1 hour may result in decreased levothyroxine effectiveness.    Since levothyroxine administration time defaults to 0600 and pantoprazole administration time defaults to 0730, there is the potential for the medications to be administered together. Added administration instruction to pantoprazole order to "Administer at least 1 hour AFTER levothyroxine."    Regina Chu, AkhilD, BCPP

## 2024-08-19 NOTE — BH INPATIENT PSYCHIATRY PROGRESS NOTE - PRN MEDS
MEDICATIONS  (PRN):  diphenhydrAMINE 50 milliGRAM(s) Oral every 6 hours PRN EPS ppx  diphenhydrAMINE Injectable 50 milliGRAM(s) IntraMuscular once PRN Severe Agitation  haloperidol     Tablet 5 milliGRAM(s) Oral every 6 hours PRN Agitation  haloperidol    Injectable 5 milliGRAM(s) IntraMuscular Once PRN Severe Agitation  LORazepam     Tablet 2 milliGRAM(s) Oral every 6 hours PRN Anxiety  LORazepam   Injectable 2 milliGRAM(s) IntraMuscular Once PRN Severe Agitation  magnesium hydroxide Suspension 30 milliLiter(s) Oral daily PRN Constipation  polyethylene glycol 3350 17 Gram(s) Oral daily PRN Constipation  zolpidem 5 milliGRAM(s) Oral at bedtime PRN Insomnia  zolpidem 5 milliGRAM(s) Oral at bedtime PRN Insomnia   MEDICATIONS  (PRN):  diphenhydrAMINE 50 milliGRAM(s) Oral every 6 hours PRN EPS ppx  diphenhydrAMINE Injectable 50 milliGRAM(s) IntraMuscular once PRN Severe Agitation  haloperidol    Injectable 5 milliGRAM(s) IntraMuscular Once PRN Severe Agitation  LORazepam     Tablet 2 milliGRAM(s) Oral every 6 hours PRN Anxiety  LORazepam   Injectable 2 milliGRAM(s) IntraMuscular Once PRN Severe Agitation  magnesium hydroxide Suspension 30 milliLiter(s) Oral daily PRN Constipation  polyethylene glycol 3350 17 Gram(s) Oral daily PRN Constipation  zolpidem 5 milliGRAM(s) Oral at bedtime PRN Insomnia  zolpidem 5 milliGRAM(s) Oral at bedtime PRN Insomnia

## 2024-08-19 NOTE — BH INPATIENT PSYCHIATRY PROGRESS NOTE - NSBHFUPINTERVALHXFT_PSY_A_CORE
Patient slept 7 hours. No acute events overnight or psychiatric PRNs    Patient is a 29 year old female with past psychiatric history of Bipolar II Disorder and OCD with no previous inpatient psychiatric hospitalization, no SA, remote self harm (last skin scratching in 2018)  BIB self to ED due to suicidal ideations. Patient resides with partner of 8 years and pet dog. 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 Patient endorses one week history of suicidal ideation with the intention to jump in front of a car. Days leading up to the impatient admission, patients states that she was feeling overwhelmed by her internship and upcoming school semester. She described her internship as demanding with difficulty adjusting to her roles and responsibility. She states that she often finds herself trying to find excuses to miss her internship, like hoping that "she falls off the stairs and breaks a bone" or " smashing into the glass table". Patient is scheduled to work from 7am to 5pm. She states her work schedule interferes with her sleep, having to wake up at 4am in the morning to commute.     Since May, she has been having difficulty staying asleep often waking up 3-4 hours after falling asleep. Of note, patient has family hx of father committing suicide in May 2019 due to PTSD from 9/11. Patient states that since high school, she grew increasingly close to her father, describing him as her "best friend". Patient is increasingly fearful of becoming her father and draws parallels to her father's excessive spending to her hyper-fixation on spending money that she does not have (which patient admits is an issue she is actively working to resolve). Patient has a child arriola trauma of possible kidnap attempt which she minimizes sating that it is "fine"    Patient states that since inpatient admission, she has not been feeling suicidal. She has been attending group therapy sessions to learn coping skills. She theorized that her suicidal ideation may be attributed to recent changes in her medications. She described feeling dysregulated since abruptly stopping quetiapine 10mg (due to facial muscle spasm) about one week ago, starting Ambien 10mg (due to trouble sleeping) and decreased sleep due to internship schedule. She also endorses vivid dreams and fluctuations in her moods.     Patient denies active suicidal and homicidal ideations. Patient slept 7 hours. No acute events overnight or psychiatric PRNs    Patient is a 29 year old female with past psychiatric history of Bipolar II Disorder and OCD with no previous inpatient psychiatric hospitalization, no SA, remote self harm (last skin scratching in 2018)  BIB self to ED due to suicidal ideations. Patient resides with partner of 8 years and pet dog. 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 Patient endorses one week history of suicidal ideation with the intention to jump in front of a car. Days leading up to the impatient admission, patients states that she was feeling overwhelmed by her internship and upcoming school semester. She described her internship as demanding with difficulty adjusting to her roles and responsibility. She states that she often finds herself trying to find excuses to miss her internship, like hoping that "she falls off the stairs and breaks a bone" or " smashing into the glass table". Patient is scheduled to work from 7am to 5pm. She states her work schedule interferes with her sleep, having to wake up at 4am in the morning to commute.     Since May, she has been having difficulty staying asleep often waking up 3-4 hours after falling asleep. Of note, patient has family hx of father committing suicide in May 2019 due to PTSD from 9/11. Patient states that since high school, she grew increasingly close to her father, describing him as her "best friend". Patient is fearful of becoming like her father given his struggle with mental health and draws parallels to her father's excessive spending habits to her hyper-fixation on spending money that she does not have (patient admits is actively working to resolve this). Patient has a childhood trauma of possible kidnap attempt which she minimizes stating that it is "fine"    Patient states that since admissions to , she has been feeling less suicidal. She has been attending group therapy sessions to learn coping skills. She theorized that her suicidal ideation may be attributed to recent changes in her medications. She described feeling dysregulated since abruptly stopping quetiapine 10mg (due to facial muscle spasm) about one week ago, starting Ambien 10mg (due to trouble sleeping) and decreased sleep due to internship schedule. She also endorses vivid dreams and fluctuations in her moods.     Patient denies active suicidal and homicidal ideations. Patient slept 7 hours. No acute events overnight or psychiatric PRNs    Patient is a 29 year old female with past psychiatric history of Bipolar II Disorder and OCD with no previous inpatient psychiatric hospitalization, no SA, remote self harm (last skin scratching in 2018)  BIB self to ED due to suicidal ideations. Patient resides with partner of 8 years and pet dog. 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 Patient endorses one week history of suicidal ideation with the intention to jump in front of a car. Days leading up to the impatient admission, patients states that she was feeling overwhelmed by her internship and upcoming school semester. She described her internship as demanding with difficulty adjusting to her roles and responsibility. She states that she often finds herself trying to find excuses to miss her internship, like hoping that "she falls off the stairs and breaks a bone" or " smashing into the glass table". Patient is scheduled to work from 7am to 5pm. She states her work schedule interferes with her sleep, having to wake up at 4am in the morning to commute.     Since May, she has been having difficulty staying asleep often waking up 3-4 hours after falling asleep. Of note, patient has family hx of father committing suicide in May 2019 due to PTSD from 9/11. Patient states that since high school, she grew increasingly close to her father, describing him as her "best friend". Patient is fearful of becoming like her father given his struggle with mental health and draws parallels to her father's excessive spending habits to her hyper-fixation on spending money that she does not have (patient admits is actively working to resolve this). Patient has a childhood trauma of possible kidnap attempt which she minimizes stating that it is "fine"    Patient states that since admissions to , she has been feeling less suicidal. She has been attending group therapy sessions to learn coping skills. She theorized that her suicidal ideation may be attributed to recent changes in her medications. She described feeling dysregulated since abruptly stopping quetiapine 10mg (due to facial muscle spasm) about one week ago, starting Ambien 10mg (due to trouble sleeping) and decreased sleep due to internship schedule. She also endorses vivid dreams and fluctuations in her moods.     Patient denies active suicidal and homicidal ideations.    Collateral: Pt's boyfriend states patient has baseline anxiety as a pharmacy intern - always worrying about the possibility of prescribing the wrong medication and killing someone. Patient has been at [employer] [facility] [place] for the past six months, working about 40-50hrs a week. He states that pt. voices that she feels like "an out man out" at work. Within the past 2 weeks, pt has only been sleeping about 10-20mins at a time, generally waking up from nightmares. Per boyfriend, patient has a fear of abandonment stemming from her father committing suicide, the death of her grandmother (pt. caregiver) and grandfather. He added that patient also has episodes of excessive spending on either luxury items or multiple small purchases causing her to accumulate debt. Boyfriend states that when he inquires about the spending, patient often states that "it helps her feel better and calmer" because "you are not going to  her". Patient slept 7 hours. No acute events overnight or psychiatric PRNs    Patient is a 29 year old female with past psychiatric history of Bipolar II Disorder and OCD with no previous inpatient psychiatric hospitalization, no SA, remote self harm (last skin scratching in 2018)  BIB self to ED due to suicidal ideations. Patient resides with partner of 8 years and pet dog. 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 Patient endorses one week history of suicidal ideation with the intention to jump in front of a car. Days leading up to the impatient admission, patients states that she was feeling overwhelmed by her internship and upcoming school semester. She described her internship as demanding with difficulty adjusting to her roles and responsibility. She states that she often finds herself trying to find excuses to miss her internship, like hoping that "she falls off the stairs and breaks a bone" or " smashing into the glass table". Patient is scheduled to work from 7am to 5pm. She states her work schedule interferes with her sleep, having to wake up at 4am in the morning to commute.     Since May, she has been having difficulty staying asleep often waking up 3-4 hours after falling asleep. Of note, patient has family hx of father committing suicide in May 2019 due to PTSD from 9/11. Patient states that since high school, she grew increasingly close to her father, describing him as her "best friend". Patient is fearful of becoming like her father given his struggle with mental health and draws parallels to her father's excessive spending habits to her hyper-fixation on spending money that she does not have (patient admits is actively working to resolve this). Patient has a childhood trauma of possible kidnap attempt which she minimizes stating that it is "fine"    Patient states that since admissions to , she has been feeling less suicidal. She has been attending group therapy sessions to learn coping skills. She theorized that her suicidal ideation may be attributed to recent changes in her medications. She described feeling dysregulated since abruptly stopping quetiapine 100mg (due to facial muscle spasm) about one week ago, starting Ambien 10mg (due to trouble sleeping) and decreased sleep due to internship schedule. She also endorses vivid dreams and fluctuations in her moods.     Patient denies active suicidal and homicidal ideations.    Collateral: Pt's boyfriend states patient has baseline anxiety as a pharmacy intern - always worrying about the possibility of prescribing the wrong medication and killing someone. Patient has been at [employer] [facility] [place] for the past six months, working about 40-50hrs a week. He states that pt. voices that she feels like "an out man out" at work. Within the past 2 weeks, pt has only been sleeping about 10-20mins at a time, generally waking up from nightmares but has accompanying tiredness. Per boyfriend, patient has a fear of abandonment stemming from her father committing suicide, the death of her grandmother (pt. caregiver) and grandfather. He added that patient also has episodes of excessive spending on either luxury items or multiple small purchases causing her to accumulate debt (added these are brief and do not last more than a day or 2). Boyfriend states that when he inquires about the spending, patient often states that "it helps her feel better and calmer" because "you are not going to  her".

## 2024-08-19 NOTE — BH INPATIENT PSYCHIATRY PROGRESS NOTE - MSE UNSTRUCTURED FT
Appearance: Dressed appropriately. Fair hygiene, dressed in street clothes  Attitude / Behavior: Cooperative   Eye contact: Fair   Motor: No PMA/PMR  Gait/station: Ambulating without issue.  Speech: Normal rate, rhythm, tone, volume. 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 No SIIP or HIIP reported.   Perceptual: No AVH   Attention: Fair  Insight: fair  Judgment: fair   Impulse Control: fair   Appearance: Dressed appropriately. Fair hygiene, dressed in street clothes  Attitude / Behavior: Cooperative   Eye contact: Fair   Motor: No PMA/PMR  Gait/station: Ambulating without issue.  Speech: Normal rate, rhythm, tone, volume. 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 No SIIP or HIIP reported.   Perceptual: No AVH   Attention: Fair  Insight: fair  Judgment: fair   Impulse Control: fair   Appearance: Dressed appropriately. Fair hygiene, dressed in street clothes  Attitude / Behavior: Cooperative   Eye contact: Fair   Motor: No PMA/PMR  Gait/station: Ambulating without issue.  Speech: Normal rate, rhythm, tone, volume. 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 No SIIP or HIIP reported.   Perceptual: No AVH   Attention: Fair  Insight: fair  Judgment: fair   Impulse Control: fair   Appearance: Dressed appropriately. Fair hygiene, dressed in street clothes  Attitude / Behavior: Cooperative   Eye contact: Fair   Motor: No PMA/PMR  Gait/station: Ambulating without issue.  Speech: Normal rate, rhythm, tone, volume. 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 No SIIP or HIIP reported.   Perceptual: No AVH   Attention: Fair  Insight: fair  Judgment: fair   Impulse Control: fair

## 2024-08-19 NOTE — BH INPATIENT PSYCHIATRY PROGRESS NOTE - CURRENT MEDICATION
MEDICATIONS  (STANDING):  glycopyrrolate 2 milliGRAM(s) Oral two times a day  levothyroxine 137 MICROGram(s) Oral daily  OXcarbazepine 300 milliGRAM(s) Oral <User Schedule>  pantoprazole    Tablet 40 milliGRAM(s) Oral before breakfast  PARoxetine 10 milliGRAM(s) Oral at bedtime    MEDICATIONS  (PRN):  diphenhydrAMINE 50 milliGRAM(s) Oral every 6 hours PRN EPS ppx  diphenhydrAMINE Injectable 50 milliGRAM(s) IntraMuscular once PRN Severe Agitation  haloperidol     Tablet 5 milliGRAM(s) Oral every 6 hours PRN Agitation  haloperidol    Injectable 5 milliGRAM(s) IntraMuscular Once PRN Severe Agitation  LORazepam     Tablet 2 milliGRAM(s) Oral every 6 hours PRN Anxiety  LORazepam   Injectable 2 milliGRAM(s) IntraMuscular Once PRN Severe Agitation  magnesium hydroxide Suspension 30 milliLiter(s) Oral daily PRN Constipation  polyethylene glycol 3350 17 Gram(s) Oral daily PRN Constipation  zolpidem 5 milliGRAM(s) Oral at bedtime PRN Insomnia  zolpidem 5 milliGRAM(s) Oral at bedtime PRN Insomnia   MEDICATIONS  (STANDING):  glycopyrrolate 2 milliGRAM(s) Oral two times a day  levothyroxine 137 MICROGram(s) Oral daily  OXcarbazepine 300 milliGRAM(s) Oral <User Schedule>  pantoprazole    Tablet 40 milliGRAM(s) Oral before breakfast  PARoxetine 10 milliGRAM(s) Oral at bedtime  traZODone 100 milliGRAM(s) Oral at bedtime    MEDICATIONS  (PRN):  diphenhydrAMINE 50 milliGRAM(s) Oral every 6 hours PRN EPS ppx  diphenhydrAMINE Injectable 50 milliGRAM(s) IntraMuscular once PRN Severe Agitation  haloperidol    Injectable 5 milliGRAM(s) IntraMuscular Once PRN Severe Agitation  LORazepam     Tablet 2 milliGRAM(s) Oral every 6 hours PRN Anxiety  LORazepam   Injectable 2 milliGRAM(s) IntraMuscular Once PRN Severe Agitation  magnesium hydroxide Suspension 30 milliLiter(s) Oral daily PRN Constipation  polyethylene glycol 3350 17 Gram(s) Oral daily PRN Constipation  zolpidem 5 milliGRAM(s) Oral at bedtime PRN Insomnia  zolpidem 5 milliGRAM(s) Oral at bedtime PRN Insomnia   MEDICATIONS  (STANDING):  glycopyrrolate 2 milliGRAM(s) Oral two times a day  levothyroxine 137 MICROGram(s) Oral daily  pantoprazole    Tablet 40 milliGRAM(s) Oral before breakfast  sertraline 25 milliGRAM(s) Oral daily  traZODone 100 milliGRAM(s) Oral at bedtime    MEDICATIONS  (PRN):  diphenhydrAMINE 50 milliGRAM(s) Oral every 6 hours PRN EPS ppx  diphenhydrAMINE Injectable 50 milliGRAM(s) IntraMuscular once PRN Severe Agitation  haloperidol    Injectable 5 milliGRAM(s) IntraMuscular Once PRN Severe Agitation  LORazepam     Tablet 2 milliGRAM(s) Oral every 6 hours PRN Anxiety  LORazepam   Injectable 2 milliGRAM(s) IntraMuscular Once PRN Severe Agitation  magnesium hydroxide Suspension 30 milliLiter(s) Oral daily PRN Constipation  polyethylene glycol 3350 17 Gram(s) Oral daily PRN Constipation  zolpidem 5 milliGRAM(s) Oral at bedtime PRN Insomnia  zolpidem 5 milliGRAM(s) Oral at bedtime PRN Insomnia

## 2024-08-19 NOTE — BH INPATIENT PSYCHIATRY PROGRESS NOTE - NSBHMETABOLIC_PSY_ALL_CORE_FT
BMI: BMI (kg/m2): 33.2 (07-24-24 @ 18:09)  HbA1c:   Glucose:   BP: 118/61 (08-18-24 @ 00:18) (117/79 - 118/61)Vital Signs Last 24 Hrs  T(C): 36.9 (08-19-24 @ 08:27), Max: 36.9 (08-19-24 @ 08:27)  T(F): 98.5 (08-19-24 @ 08:27), Max: 98.5 (08-19-24 @ 08:27)  HR: --  BP: --  BP(mean): --  RR: --  SpO2: --    Orthostatic VS  08-19-24 @ 08:27  Lying BP: --/-- HR: --  Sitting BP: 114/61 HR: 82  Standing BP: 109/71 HR: 93  Site: upper left arm  Mode: electronic  Orthostatic VS  08-18-24 @ 00:45  Lying BP: --/-- HR: --  Sitting BP: 115/74 HR: 63  Standing BP: 123/82 HR: 66  Site: --  Mode: --    Lipid Panel:  BMI: BMI (kg/m2): 33.2 (07-24-24 @ 18:09)  HbA1c:   Glucose:   BP: 118/61 (08-18-24 @ 00:18) (117/79 - 118/61)Vital Signs Last 24 Hrs  T(C): 36.5 (08-19-24 @ 20:52), Max: 36.9 (08-19-24 @ 08:27)  T(F): 97.7 (08-19-24 @ 20:52), Max: 98.5 (08-19-24 @ 08:27)  HR: --  BP: --  BP(mean): --  RR: --  SpO2: --    Orthostatic VS  08-19-24 @ 08:27  Lying BP: --/-- HR: --  Sitting BP: 114/61 HR: 82  Standing BP: 109/71 HR: 93  Site: upper left arm  Mode: electronic    Lipid Panel:

## 2024-08-19 NOTE — BH SOCIAL WORK INITIAL PSYCHOSOCIAL EVALUATION - OTHER PAST PSYCHIATRIC HISTORY (INCLUDE DETAILS REGARDING ONSET, COURSE OF ILLNESS, INPATIENT/OUTPATIENT TREATMENT)
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 Patient is diagnosed with Bipolar II Disorder. She has a remote history of scratching herself. The patient has no previous inpatient psychiatric hospitalizations. There is no reported history of substance abuse, aggression, or legal issues. She is in treatment with Dr. Azael Butler at [facility] and has biweekly therapy with Kyra at [facility], the numbers are not available to this writer at this time. The patient, reportedly, has chronic suicidal thoughts, but there has been no actual concern until the few days heading into this hospitalization. The patient's partner reported that she has not been sleeping well, has been crying, anxious, and suddenly took a month off from work and school. The patient stated that she has been had symptoms of depression including poor adl care, motivation and concentration with anergia. These have been mixed with periods of energy. The patient has had passive SI with thoughts to overdose or stab herself with a machete. Just prior to admission the patient was at the train and thought to jump in front. She stopped herself, went home, and tried to sleep. Then she called her psychiatrist and told him what had happened and how she was feeling and he had her come to the ED for evaluation. The patient reported a history of feeling like people were watching her and she has had AH in which she heard sounds and information. Patient has been calm and cooperative on the unit but continues to report poor mood and anxiety. She feels safe on the unit however. Patient will, likely return to previous outpatient providers. She has [employer] P[employer].

## 2024-08-20 PROCEDURE — 99232 SBSQ HOSP IP/OBS MODERATE 35: CPT | Mod: GC

## 2024-08-20 PROCEDURE — 90853 GROUP PSYCHOTHERAPY: CPT

## 2024-08-20 RX ORDER — SERTRALINE HYDROCHLORIDE 50 MG/1
50 TABLET, FILM COATED ORAL DAILY
Refills: 0 | Status: DISCONTINUED | OUTPATIENT
Start: 2024-08-21 | End: 2024-08-22

## 2024-08-20 RX ADMIN — LORAZEPAM 2 MILLIGRAM(S): 4 INJECTION INTRAMUSCULAR; INTRAVENOUS at 23:39

## 2024-08-20 RX ADMIN — GLYCOPYRROLATE 2 MILLIGRAM(S): 0.2 INJECTION INTRAMUSCULAR; INTRAVENOUS at 20:40

## 2024-08-20 RX ADMIN — Medication 137 MICROGRAM(S): at 06:07

## 2024-08-20 RX ADMIN — Medication 40 MILLIGRAM(S): at 08:23

## 2024-08-20 RX ADMIN — GLYCOPYRROLATE 2 MILLIGRAM(S): 0.2 INJECTION INTRAMUSCULAR; INTRAVENOUS at 08:24

## 2024-08-20 RX ADMIN — Medication 100 MILLIGRAM(S): at 20:40

## 2024-08-20 RX ADMIN — ZOLPIDEM TARTRATE 5 MILLIGRAM(S): 5 TABLET, FILM COATED ORAL at 22:51

## 2024-08-20 RX ADMIN — SERTRALINE HYDROCHLORIDE 25 MILLIGRAM(S): 50 TABLET, FILM COATED ORAL at 08:23

## 2024-08-20 NOTE — BH INPATIENT PSYCHIATRY PROGRESS NOTE - PRN MEDS
MEDICATIONS  (PRN):  diphenhydrAMINE 50 milliGRAM(s) Oral every 6 hours PRN EPS ppx  diphenhydrAMINE Injectable 50 milliGRAM(s) IntraMuscular once PRN Severe Agitation  haloperidol    Injectable 5 milliGRAM(s) IntraMuscular Once PRN Severe Agitation  LORazepam     Tablet 2 milliGRAM(s) Oral every 6 hours PRN Anxiety  LORazepam   Injectable 2 milliGRAM(s) IntraMuscular Once PRN Severe Agitation  magnesium hydroxide Suspension 30 milliLiter(s) Oral daily PRN Constipation  polyethylene glycol 3350 17 Gram(s) Oral daily PRN Constipation  zolpidem 5 milliGRAM(s) Oral at bedtime PRN Insomnia  zolpidem 5 milliGRAM(s) Oral at bedtime PRN Insomnia

## 2024-08-20 NOTE — BH INPATIENT PSYCHIATRY PROGRESS NOTE - MSE UNSTRUCTURED FT
Appearance: Dressed appropriately. Fair hygiene, dressed in street clothes  Attitude / Behavior: Cooperative   Eye contact: Fair   Motor: No PMA/PMR  Gait/station: Ambulating without issue.  Speech: Normal rate, rhythm, tone, volume. 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 No SIIP or HIIP reported.   Perceptual: No AVH   Attention: Fair  Insight: fair  Judgment: fair   Impulse Control: fair   Appearance: Dressed appropriately. Fair hygiene, dressed in street clothes  Attitude / Behavior: Cooperative   Eye contact: Fair   Motor: No PMA/PMR  Gait/station: Ambulating without issue.  Speech: midly decreased rate, Normal rhythm and tone, volume. 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 No SIIP or HIIP reported.   Perceptual: No AVH   Attention: Fair  Insight: fair  Judgment: fair   Impulse Control: fair

## 2024-08-20 NOTE — BH PSYCHOLOGY - GROUP THERAPY NOTE - NSPSYCHOLGRPDBTPT_PSY_A_CORE
stable mood and affect in group/no self-destructive impulses/behaviors/Patient able to identify mood states/other...

## 2024-08-20 NOTE — BH INPATIENT PSYCHIATRY PROGRESS NOTE - NSBHFUPINTERVALHXFT_PSY_A_CORE
Patient slept 7 hours. No acute events overnight or psychiatric PRNs    Patient was cooperative and calm during the interview. Her had a depressed affect and fatigue. She continues to report decreased sleep, waking up frequently over the course of the night due to nightmares. She shared that the nightmares feel vividly real and dramatically affect her emotional states. Pt admits to having excessive anxiety over making a pharmacological error at work although she is aware that there are many checks and balances to prevent medication errors. 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     Patient denies suicidal and homicidal ideation.      Patient slept 7 hours. No acute events overnight or psychiatric PRNs, but remains fairly obsessive about nightmares and sleep quality.     Patient was cooperative and calm during the interview. Her had a depressed affect and fatigue. She continues to report decreased sleep, waking up frequently over the course of the night due to nightmares. She shared that the nightmares feel vividly real and dramatically affect her emotional states. Pt admits to having excessive anxiety over making a pharmacological error at work although she is aware that there are many checks and balances to prevent medication errors. 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     Patient denies suicidal and homicidal ideation.      Patient with difficulty sleeping. No acute events overnight or psychiatric PRNs, but remains fairly obsessive about nightmares and sleep quality.     Patient was cooperative and calm during the interview. Her had a depressed affect and fatigue. She continues to report difficulty sleeping, waking up frequently over the course of the night due to nightmares. She shared that the nightmares feel vividly real and dramatically affect her emotional states. Pt admits to having excessive anxiety over making a pharmacological error at work although she is aware that there are many checks and balances to prevent medication errors. 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     Patient denies suicidal and homicidal ideation.

## 2024-08-20 NOTE — BH INPATIENT PSYCHIATRY PROGRESS NOTE - NSBHASSESSSUMMFT_PSY_ALL_CORE
Patient is a 29 year old female with past psychiatric history of Bipolar II Disorder and OCD with no previous inpatient psychiatric hospitalization, denies hx of SA, remote self harm (last skin scratching in 2018), denies legal and substance use hx, BIB self to ED due to suicidal ideations. Patient resides with partner of 8 years and pet dog. 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 Patient endorses one week history of suicidal ideation with the intention to jump in front of a car, difficulty sleeping and fluctuations in mood. 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     Diagnostic considerations on admit: Given patient presentation, she is being evaluated for Borderline disorder vs JOHN vs OCD    Since admission to : patient has been participating in individual and group therapy     Plan:   -Legal: admit to  on 9.13 Voluntary, pt meets criteria for, and is expected to benefit from voluntary inpatient hosp.   -Safety: -Appropriate for routine checks, not expected to pose danger to self or others in controlled inpatient setting  #Psychiatric: focus on reducing complexity of meds to avoid interactions  -dc Trileptal 2/2 questionable utility  - Discontinue Paxil 12.5mg CR therapeutic exchange of Paxil 10mg po qd (some concern that paxil HS admin may be contributing to sleep issues)  - Trazadone 100mg Qhs for insomnia  - Zoloft 25mg daily for anxiety/OCD   -Agitation  PRNS: Haldol 5mg, Ativan 2mg, Benadryl 50mg PO/IM PRN for agitation, Zolpidem 5mg for sleep    - I/G/M therapy  #Medical: Graves, Hypothyroidism, PE  - on Synthroid 137 mcg PO qd for Grave's dz (s/p thyroidectomy)  - Glycopyrrolate 2mg BID  - hold metoprolol for now, will discuss w hospitalist.   Admit labs reviewed: CBC, CMP, TSH, Utox (-), UA, HCg: negative, Covid/RVP:negative   EKG: admit EKG reviewed: From Date 8/17/24 Rate:  NSR 66, QTC: 417ms  #Dispo:  pending stabilization   #collateral: obtained from boyfriend today.   suspecting Borderline Personality Disorder  to be the actual working diagnosis in this case  - hx of PE but not currently on anticoagulation; completed 6-month course as per records     Patient is a 29 year old female with past psychiatric history of Bipolar II Disorder and OCD with no previous inpatient psychiatric hospitalization, denies hx of SA, remote self harm (last skin scratching in 2018), denies legal and substance use hx, BIB self to ED due to suicidal ideations. Patient resides with partner of 8 years and pet dog. 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 Patient endorses one week history of suicidal ideation with the intention to jump in front of a car, difficulty sleeping and fluctuations in mood. 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     Diagnostic considerations on admit: Given patient presentation, she is being evaluated for Borderline disorder vs JOHN vs OCD  With ongoing assessment pt seems quite blunted, low energy, raising suspicion for MDE.     Since admission to : patient has been participating in individual and group therapy     Plan:   -Legal: admit to  on 9.13 Voluntary, pt meets criteria for, and is expected to benefit from voluntary inpatient hosp.   -Safety: -Appropriate for routine checks, not expected to pose danger to self or others in controlled inpatient setting  #Psychiatric: focus on reducing complexity of meds to avoid interactions  -dc Trileptal 2/2 questionable utility  - Discontinue Paxil 12.5mg CR therapeutic exchange of Paxil 10mg po qd (some concern that paxil HS admin may be contributing to sleep issues)  - Trazadone 100mg Qhs for insomnia  - Zoloft 25mg daily for anxiety/OCD   -Agitation  PRNS: Haldol 5mg, Ativan 2mg, Benadryl 50mg PO/IM PRN for agitation, Zolpidem 5mg for sleep    - I/G/M therapy  #Medical: Graves, Hypothyroidism, PE  - on Synthroid 137 mcg PO qd for Grave's dz (s/p thyroidectomy)  - Glycopyrrolate 2mg BID  - hold metoprolol for now, will discuss w hospitalist.   Admit labs reviewed: CBC, CMP, TSH, Utox (-), UA, HCg: negative, Covid/RVP:negative   EKG: admit EKG reviewed: From Date 8/17/24 Rate:  NSR 66, QTC: 417ms  #Dispo:  pending stabilization   #collateral: obtained from boyfriend 8/19  - hx of PE but not currently on anticoagulation; completed 6-month course as per records

## 2024-08-20 NOTE — BH INPATIENT PSYCHIATRY PROGRESS NOTE - CURRENT MEDICATION
MEDICATIONS  (STANDING):  glycopyrrolate 2 milliGRAM(s) Oral two times a day  levothyroxine 137 MICROGram(s) Oral daily  pantoprazole    Tablet 40 milliGRAM(s) Oral before breakfast  sertraline 25 milliGRAM(s) Oral daily  traZODone 100 milliGRAM(s) Oral at bedtime    MEDICATIONS  (PRN):  diphenhydrAMINE 50 milliGRAM(s) Oral every 6 hours PRN EPS ppx  diphenhydrAMINE Injectable 50 milliGRAM(s) IntraMuscular once PRN Severe Agitation  haloperidol    Injectable 5 milliGRAM(s) IntraMuscular Once PRN Severe Agitation  LORazepam     Tablet 2 milliGRAM(s) Oral every 6 hours PRN Anxiety  LORazepam   Injectable 2 milliGRAM(s) IntraMuscular Once PRN Severe Agitation  magnesium hydroxide Suspension 30 milliLiter(s) Oral daily PRN Constipation  polyethylene glycol 3350 17 Gram(s) Oral daily PRN Constipation  zolpidem 5 milliGRAM(s) Oral at bedtime PRN Insomnia  zolpidem 5 milliGRAM(s) Oral at bedtime PRN Insomnia   MEDICATIONS  (STANDING):  glycopyrrolate 2 milliGRAM(s) Oral two times a day  levothyroxine 137 MICROGram(s) Oral daily  pantoprazole    Tablet 40 milliGRAM(s) Oral before breakfast  traZODone 100 milliGRAM(s) Oral at bedtime    MEDICATIONS  (PRN):  diphenhydrAMINE 50 milliGRAM(s) Oral every 6 hours PRN EPS ppx  diphenhydrAMINE Injectable 50 milliGRAM(s) IntraMuscular once PRN Severe Agitation  haloperidol    Injectable 5 milliGRAM(s) IntraMuscular Once PRN Severe Agitation  LORazepam     Tablet 2 milliGRAM(s) Oral every 6 hours PRN Anxiety  LORazepam   Injectable 2 milliGRAM(s) IntraMuscular Once PRN Severe Agitation  magnesium hydroxide Suspension 30 milliLiter(s) Oral daily PRN Constipation  polyethylene glycol 3350 17 Gram(s) Oral daily PRN Constipation  zolpidem 5 milliGRAM(s) Oral at bedtime PRN Insomnia  zolpidem 5 milliGRAM(s) Oral at bedtime PRN Insomnia

## 2024-08-20 NOTE — BH INPATIENT PSYCHIATRY PROGRESS NOTE - NSBHCHARTREVIEWVS_PSY_A_CORE FT
Vital Signs Last 24 Hrs  T(C): 36.7 (08-20-24 @ 08:16), Max: 36.7 (08-20-24 @ 08:16)  T(F): 98.1 (08-20-24 @ 08:16), Max: 98.1 (08-20-24 @ 08:16)  HR: --  BP: --  BP(mean): --  RR: 12 (08-20-24 @ 08:16) (12 - 12)  SpO2: --    Orthostatic VS  08-20-24 @ 08:16  Lying BP: --/-- HR: --  Sitting BP: 108/75 HR: 80  Standing BP: 113/80 HR: 100  Site: --  Mode: --  Orthostatic VS  08-19-24 @ 08:27  Lying BP: --/-- HR: --  Sitting BP: 114/61 HR: 82  Standing BP: 109/71 HR: 93  Site: upper left arm  Mode: electronic

## 2024-08-20 NOTE — BH INPATIENT PSYCHIATRY PROGRESS NOTE - NSBHMETABOLIC_PSY_ALL_CORE_FT
BMI: BMI (kg/m2): 33.2 (07-24-24 @ 18:09)  HbA1c:   Glucose:   BP: 118/61 (08-18-24 @ 00:18) (117/79 - 118/61)Vital Signs Last 24 Hrs  T(C): 36.7 (08-20-24 @ 08:16), Max: 36.7 (08-20-24 @ 08:16)  T(F): 98.1 (08-20-24 @ 08:16), Max: 98.1 (08-20-24 @ 08:16)  HR: --  BP: --  BP(mean): --  RR: 12 (08-20-24 @ 08:16) (12 - 12)  SpO2: --    Orthostatic VS  08-20-24 @ 08:16  Lying BP: --/-- HR: --  Sitting BP: 108/75 HR: 80  Standing BP: 113/80 HR: 100  Site: --  Mode: --  Orthostatic VS  08-19-24 @ 08:27  Lying BP: --/-- HR: --  Sitting BP: 114/61 HR: 82  Standing BP: 109/71 HR: 93  Site: upper left arm  Mode: electronic    Lipid Panel:

## 2024-08-21 DIAGNOSIS — G47.00 INSOMNIA, UNSPECIFIED: ICD-10-CM

## 2024-08-21 DIAGNOSIS — F41.9 ANXIETY DISORDER, UNSPECIFIED: ICD-10-CM

## 2024-08-21 PROCEDURE — 90853 GROUP PSYCHOTHERAPY: CPT

## 2024-08-21 RX ORDER — METOPROLOL TARTRATE 100 MG/1
1 TABLET ORAL
Qty: 0 | Refills: 0 | DISCHARGE
Start: 2024-08-21

## 2024-08-21 RX ORDER — ZOLPIDEM TARTRATE 5 MG/1
1 TABLET, FILM COATED ORAL
Qty: 0 | Refills: 0 | DISCHARGE
Start: 2024-08-21

## 2024-08-21 RX ORDER — TRAZODONE HCL 50 MG
1 TABLET ORAL
Qty: 10 | Refills: 0
Start: 2024-08-21 | End: 2024-08-30

## 2024-08-21 RX ORDER — METOPROLOL TARTRATE 100 MG/1
50 TABLET ORAL DAILY
Refills: 0 | Status: DISCONTINUED | OUTPATIENT
Start: 2024-08-22 | End: 2024-08-22

## 2024-08-21 RX ORDER — LORAZEPAM 4 MG/ML
2 INJECTION INTRAMUSCULAR; INTRAVENOUS EVERY 6 HOURS
Refills: 0 | Status: DISCONTINUED | OUTPATIENT
Start: 2024-08-21 | End: 2024-08-22

## 2024-08-21 RX ORDER — LORAZEPAM 4 MG/ML
2 INJECTION INTRAMUSCULAR; INTRAVENOUS ONCE
Refills: 0 | Status: DISCONTINUED | OUTPATIENT
Start: 2024-08-21 | End: 2024-08-22

## 2024-08-21 RX ORDER — LEVOTHYROXINE SODIUM 100 MCG
1 TABLET ORAL
Qty: 0 | Refills: 0 | DISCHARGE
Start: 2024-08-21

## 2024-08-21 RX ORDER — TRAZODONE HCL 50 MG
150 TABLET ORAL AT BEDTIME
Refills: 0 | Status: DISCONTINUED | OUTPATIENT
Start: 2024-08-21 | End: 2024-08-22

## 2024-08-21 RX ORDER — PANTOPRAZOLE SODIUM 40 MG
1 TABLET, DELAYED RELEASE (ENTERIC COATED) ORAL
Qty: 0 | Refills: 0 | DISCHARGE
Start: 2024-08-21

## 2024-08-21 RX ORDER — GLYCOPYRROLATE 0.2 MG/ML
1 INJECTION INTRAMUSCULAR; INTRAVENOUS
Qty: 0 | Refills: 0 | DISCHARGE
Start: 2024-08-21

## 2024-08-21 RX ORDER — POLYETHYLENE GLYCOL 3350 17 G/17G
17 POWDER, FOR SOLUTION ORAL
Qty: 0 | Refills: 0 | DISCHARGE
Start: 2024-08-21

## 2024-08-21 RX ORDER — SERTRALINE HYDROCHLORIDE 50 MG/1
1 TABLET, FILM COATED ORAL
Qty: 10 | Refills: 0
Start: 2024-08-21 | End: 2024-08-30

## 2024-08-21 RX ADMIN — Medication 150 MILLIGRAM(S): at 21:05

## 2024-08-21 RX ADMIN — GLYCOPYRROLATE 2 MILLIGRAM(S): 0.2 INJECTION INTRAMUSCULAR; INTRAVENOUS at 08:52

## 2024-08-21 RX ADMIN — SERTRALINE HYDROCHLORIDE 50 MILLIGRAM(S): 50 TABLET, FILM COATED ORAL at 08:52

## 2024-08-21 RX ADMIN — Medication 40 MILLIGRAM(S): at 07:06

## 2024-08-21 RX ADMIN — Medication 137 MICROGRAM(S): at 06:04

## 2024-08-21 RX ADMIN — GLYCOPYRROLATE 2 MILLIGRAM(S): 0.2 INJECTION INTRAMUSCULAR; INTRAVENOUS at 21:04

## 2024-08-21 NOTE — BH PSYCHOLOGY - GROUP THERAPY NOTE - NSPSYCHOLGRPDBTPT_PSY_A_CORE FT
DBT Group is a group in which patients learn skills to better manage their emotions and behaviors. Group begins with a mindfulness practice so that patients have an opportunity to practice observing their internal and external experiences.  Following the mindfulness exercise the group learns new skills in a didactic format. Group today focused on the “distress tolerance” module, which are skills to help patients manage their crisis urges.  Specifically, pts learned the skill of “radical acceptance,” which includes accepting painful situations in their lives they cannot change through turning the mind and practicing willingness. Patients were asked to determine difficult situations in their lives they needed to work on accepting, and provided examples of ways to practice this while in the hospital. 
DBT skills generalization group is a group in which patients review the skill taught the day before, and patients have the opportunity to troubleshoot the skill, engage in more practice, and share their experience using the skill. Today’s skills review group focused on the skills of “radical acceptance” and "willingness" which include accepting painful situations in their lives they cannot change through turning the mind and practicing engaging in reality effectively. Patients were asked to determine difficult situations in their lives they needed to work on accepting, and provided examples of ways to practice this while in the hospital.

## 2024-08-21 NOTE — BH INPATIENT PSYCHIATRY PROGRESS NOTE - NSBHATTESTBILLING_PSY_A_CORE
18263-[id number] OBS or IP - moderate complexity OR 35-49 mins
31267-Cqetbmvhpp OBS or IP - moderate complexity OR 35-49 mins
61282-Iznxkeltfc OBS or IP - moderate complexity OR 35-49 mins

## 2024-08-21 NOTE — BH TREATMENT PLAN - NSCMSPTSTRENGTHS_PSY_ALL_CORE
Compliance to treatment/Expressive of emotions/Highly motivated for treatment/Strong support system/Supportive family
Compliance to treatment/Expressive of emotions/Highly motivated for treatment/Strong support system/Supportive family

## 2024-08-21 NOTE — BH INPATIENT PSYCHIATRY PROGRESS NOTE - MSE UNSTRUCTURED FT
Appearance: Dressed appropriately. Fair hygiene, dressed in street clothes  Attitude / Behavior: Cooperative   Eye contact: Fair   Motor: No PMA/PMR  Gait/station: Ambulating without issue.  Speech: midly decreased rate, Normal rhythm and tone, volume. 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 No SIIP or HIIP reported.   Perceptual: No AVH   Attention: Fair  Insight: fair  Judgment: fair   Impulse Control: fair   Appearance: Dressed appropriately. Fair hygiene, dressed in street clothes  Attitude / Behavior: Cooperative   Eye contact: Fair   Motor: No PMA/PMR  Gait/station: Ambulating without issue.  Speech: normal decreased rate, Normal rhythm and tone, volume. Fluent  Mood: "good"  Affect: Labile, reactive  Thought Process: Linear, goal directed  Thought Content: Discharge and coping skill focused No SIIP or HIIP reported.   Perceptual: No AVH   Attention: Fair  Insight: fair  Judgment: fair   Impulse Control: fair   Appearance: Dressed appropriately. Fair hygiene, dressed in street clothes  Attitude / Behavior: Cooperative   Eye contact: Fair   Motor: No PMA/PMR  Gait/station: Ambulating without issue.  Speech: normal decreased rate, Normal rhythm and tone, volume. Fluent  Mood: "good"  Affect: stable, reactive.   Thought Process: Linear, goal directed  Thought Content: Discharge and coping skill focused No SIIP or HIIP reported.   Perceptual: No AVH   Attention: Fair  Insight: fair  Judgment: fair   Impulse Control: fair

## 2024-08-21 NOTE — BH PSYCHOLOGY - GROUP THERAPY NOTE - NSPSYCHOLGRPDBTGOAL_PSY_A_CORE
reduce mood and affective lability/reduce suicidal ideation/risk of attempt/improve ability to indentify feelings/improve ability to communicate feelings/reduce vulnerability to emotional dysregualation
reduce mood and affective lability/reduce suicidal ideation/risk of attempt/improve ability to indentify feelings/improve ability to communicate feelings/reduce vulnerability to emotional dysregualation

## 2024-08-21 NOTE — BH INPATIENT PSYCHIATRY PROGRESS NOTE - CURRENT MEDICATION
MEDICATIONS  (STANDING):  glycopyrrolate 2 milliGRAM(s) Oral two times a day  levothyroxine 137 MICROGram(s) Oral daily  pantoprazole    Tablet 40 milliGRAM(s) Oral before breakfast  sertraline 50 milliGRAM(s) Oral daily  traZODone 100 milliGRAM(s) Oral at bedtime    MEDICATIONS  (PRN):  diphenhydrAMINE 50 milliGRAM(s) Oral every 6 hours PRN EPS ppx  diphenhydrAMINE Injectable 50 milliGRAM(s) IntraMuscular once PRN Severe Agitation  haloperidol    Injectable 5 milliGRAM(s) IntraMuscular Once PRN Severe Agitation  LORazepam     Tablet 2 milliGRAM(s) Oral every 6 hours PRN Anxiety  LORazepam   Injectable 2 milliGRAM(s) IntraMuscular Once PRN Severe Agitation  magnesium hydroxide Suspension 30 milliLiter(s) Oral daily PRN Constipation  polyethylene glycol 3350 17 Gram(s) Oral daily PRN Constipation  zolpidem 5 milliGRAM(s) Oral at bedtime PRN Insomnia  zolpidem 5 milliGRAM(s) Oral at bedtime PRN Insomnia   MEDICATIONS  (STANDING):  glycopyrrolate 2 milliGRAM(s) Oral two times a day  levothyroxine 137 MICROGram(s) Oral daily  metoprolol tartrate 50 milliGRAM(s) Oral daily  pantoprazole    Tablet 40 milliGRAM(s) Oral before breakfast  sertraline 50 milliGRAM(s) Oral daily  traZODone 150 milliGRAM(s) Oral at bedtime    MEDICATIONS  (PRN):  diphenhydrAMINE 50 milliGRAM(s) Oral every 6 hours PRN EPS ppx  diphenhydrAMINE Injectable 50 milliGRAM(s) IntraMuscular once PRN Severe Agitation  haloperidol    Injectable 5 milliGRAM(s) IntraMuscular Once PRN Severe Agitation  LORazepam     Tablet 2 milliGRAM(s) Oral every 6 hours PRN Anxiety  LORazepam   Injectable 2 milliGRAM(s) IntraMuscular Once PRN Severe Agitation  magnesium hydroxide Suspension 30 milliLiter(s) Oral daily PRN Constipation  polyethylene glycol 3350 17 Gram(s) Oral daily PRN Constipation  zolpidem 5 milliGRAM(s) Oral at bedtime PRN Insomnia  zolpidem 5 milliGRAM(s) Oral at bedtime PRN Insomnia

## 2024-08-21 NOTE — BH TREATMENT PLAN - NSTXMEDICINTERPR_PSY_ALL_CORE
Writer met with patient to orient patient to unit 1S as well as the role/function of activities specialists and inpatient psychiatric rehabilitation programming. Patient demonstrated full engagement with writer during initial session, presenting as appropriate dressed and well-groomed with a depressed mood. Patient was able to identify an appropriate rehabilitation goal within the context of presenting symptoms. Patient rehabilitation goal is to take all medications as prescribed. Psychiatric rehabilitation staff will meet with patient weekly to review progress towards goal.
Writer met with patient to orient patient to unit 1S as well as the role/function of activities specialists and inpatient psychiatric rehabilitation programming. Patient demonstrated full engagement with writer during initial session, presenting as appropriate dressed and well-groomed with a depressed mood. Patient was able to identify an appropriate rehabilitation goal within the context of presenting symptoms. Patient rehabilitation goal is to take all medications as prescribed. Psychiatric rehabilitation staff will meet with patient weekly to review progress towards goal.

## 2024-08-21 NOTE — BH INPATIENT PSYCHIATRY PROGRESS NOTE - NSBHATTESTCOMMENTATTENDFT_PSY_A_CORE
Patient seen, chart reviewed, case discussed with team.  I saw the patient with the resident, discussed case with resident and reviewed all sections of the note, made changes where appropriate and agree with it as written.
Patient see, chart reviewed, Case discussed with team. I saw the patient with the medical student, discussed the case with the student and reviewed all sections of the note, made changes where appropriate and agree with the note as written. 
Patient see, chart reviewed, Case discussed with team. I saw the patient with the medical student, discussed the case with the student and reviewed all sections of the note, made changes where appropriate and agree with the note as written.

## 2024-08-21 NOTE — BH INPATIENT PSYCHIATRY PROGRESS NOTE - NSTXANXINTERMD_PSY_ALL_CORE
DBT, I/G/M therapy, med optimization

## 2024-08-21 NOTE — BH TREATMENT PLAN - NSTXDCFAMINTERSW_PSY_ALL_CORE
Support and psychoed to be provided and all elements of the discharge plans to be arranged when appropriate.
Support and psychoed to be provided and all elements of the discharge plans to be arranged when appropriate.

## 2024-08-21 NOTE — BH INPATIENT PSYCHIATRY PROGRESS NOTE - NSBHASSESSSUMMFT_PSY_ALL_CORE
Patient is a 29 year old female with past psychiatric history of Bipolar II Disorder and OCD with no previous inpatient psychiatric hospitalization, denies hx of SA, remote self harm (last skin scratching in 2018), denies legal and substance use hx, BIB self to ED due to suicidal ideations. Patient resides with partner of 8 years and pet dog. 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 Patient endorses one week history of suicidal ideation with the intention to jump in front of a car, difficulty sleeping and fluctuations in mood. 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     Diagnostic considerations on admit: Given patient presentation, she is being evaluated for Borderline disorder vs JOHN vs OCD  With ongoing assessment pt seems quite blunted, low energy, raising suspicion for MDE.     Since admission to : patient has been participating in individual and group therapy     Plan:   -Legal: admit to  on 9.13 Voluntary, pt meets criteria for, and is expected to benefit from voluntary inpatient hosp.   -Safety: -Appropriate for routine checks, not expected to pose danger to self or others in controlled inpatient setting  #Psychiatric: focus on reducing complexity of meds to avoid interactions  -dc Trileptal 2/2 questionable utility  - Discontinue Paxil 12.5mg CR therapeutic exchange of Paxil 10mg po qd (some concern that paxil HS admin may be contributing to sleep issues)  - Trazadone 100mg Qhs for insomnia  - Zoloft 25mg daily for anxiety/OCD   -Agitation  PRNS: Haldol 5mg, Ativan 2mg, Benadryl 50mg PO/IM PRN for agitation, Zolpidem 5mg for sleep    - I/G/M therapy  #Medical: Graves, Hypothyroidism, PE  - on Synthroid 137 mcg PO qd for Grave's dz (s/p thyroidectomy)  - Glycopyrrolate 2mg BID  - hold metoprolol for now, will discuss w hospitalist.   Admit labs reviewed: CBC, CMP, TSH, Utox (-), UA, HCg: negative, Covid/RVP:negative   EKG: admit EKG reviewed: From Date 8/17/24 Rate:  NSR 66, QTC: 417ms  #Dispo:  pending stabilization   #collateral: obtained from boyfriend 8/19  - hx of PE but not currently on anticoagulation; completed 6-month course as per records     Patient is a 29 year old female with past psychiatric history of Bipolar II Disorder and OCD with no previous inpatient psychiatric hospitalization, denies hx of SA, remote self harm (last skin scratching in 2018), denies legal and substance use hx, BIB self to ED due to suicidal ideations. Patient resides with partner of 8 years and pet dog. 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 Patient endorses one week history of suicidal ideation with the intention to jump in front of a car, difficulty sleeping and fluctuations in mood. 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     Diagnostic considerations on admit: Given patient presentation, she is being evaluated for Borderline disorder vs JOHN vs OCD  With ongoing assessment pt seems quite blunted, low energy, raising suspicion for MDE.     Since admission to : patient has been participating in individual and group therapy     Plan:   -Legal: admit to  on 9.13 Voluntary, pt meets criteria for, and is expected to benefit from voluntary inpatient hosp.   -Safety: -Appropriate for routine checks, not expected to pose danger to self or others in controlled inpatient setting  #Psychiatric: focus on reducing complexity of meds to avoid interactions  -dc Trileptal 2/2 questionable utility  - Discontinue Paxil 12.5mg CR therapeutic exchange of Paxil 10mg po qd (some concern that paxil HS admin may be contributing to sleep issues)  - Increase Trazadone to 150mg Qhs, 8/21/24 -  (100mg Qhs for insomnia)  - Zoloft 25mg daily for anxiety/OCD   -Agitation  PRNS: Haldol 5mg, Ativan 2mg, Benadryl 50mg PO/IM PRN for agitation, Zolpidem 5mg for sleep    - I/G/M therapy  #Medical: Graves, Hypothyroidism, PE  - on Synthroid 137 mcg PO qd for Grave's dz (s/p thyroidectomy)  - Glycopyrrolate 2mg BID  - hold metoprolol for now, will discuss w hospitalist.   Admit labs reviewed: CBC, CMP, TSH, Utox (-), UA, HCg: negative, Covid/RVP:negative   EKG: admit EKG reviewed: From Date 8/17/24 Rate:  NSR 66, QTC: 417ms  #Dispo:  pending stabilization   #collateral: obtained from boyfriend 8/19  - hx of PE but not currently on anticoagulation; completed 6-month course as per records     Patient is a 29 year old female with past psychiatric history of Bipolar II Disorder and OCD with no previous inpatient psychiatric hospitalization, denies hx of SA, remote self harm (last skin scratching in 2018), denies legal and substance use hx, BIB self to ED due to suicidal ideations. Patient resides with partner of 8 years and pet dog. 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 Patient endorses one week history of suicidal ideation with the intention to jump in front of a car, difficulty sleeping and fluctuations in mood. 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     Diagnostic considerations on admit: Given patient presentation, she is being evaluated for Borderline disorder vs JOHN vs OCD      Since admission to : patient has been participating in individual and group therapy     Plan:   -Legal: admit to  on 9.13 Voluntary, pt meets criteria for, and is expected to benefit from voluntary inpatient hosp.   -Safety: -Appropriate for routine checks, not expected to pose danger to self or others in controlled inpatient setting  #Psychiatric: focus on reducing complexity of meds to avoid interactions  -dc Trileptal 2/2 questionable utility  - Discontinue Paxil 12.5mg CR therapeutic exchange of Paxil 10mg po qd (some concern that paxil HS admin may be contributing to sleep issues)  - Increase Trazadone to 150mg Qhs, 8/21/24 -  (100mg Qhs for insomnia)  - Zoloft 25mg daily for anxiety/OCD increased to 50mg    -Agitation  PRNS: Haldol 5mg, Ativan 2mg, Benadryl 50mg PO/IM PRN for agitation, Zolpidem 5mg for sleep    - I/G/M therapy  #Medical: Graves, Hypothyroidism, PE  - on Synthroid 137 mcg PO qd for Grave's dz (s/p thyroidectomy)  - Glycopyrrolate 2mg BID  - resume metoprolol for SVT  Admit labs reviewed: CBC, CMP, TSH, Utox (-), UA, HCg: negative, Covid/RVP:negative   EKG: admit EKG reviewed: From Date 8/17/24 Rate:  NSR 66, QTC: 417ms  #Dispo:  pending stabilization   #collateral: obtained from boyfriend 8/19  - hx of PE but not currently on anticoagulation; completed 6-month course as per records
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
## 2024-08-21 NOTE — BH INPATIENT PSYCHIATRY PROGRESS NOTE - NSTXSUICIDINTERMD_PSY_ALL_CORE
DBT, safety planning, I/G/M therapy 

## 2024-08-21 NOTE — BH PSYCHOLOGY - GROUP THERAPY NOTE - TOKEN PULL-DIAGNOSIS
Primary Diagnosis:  Bipolar II disorder [F31.81]        Problem Dx:   Unspecified mood [affective] disorder [F39]      
Primary Diagnosis:  Bipolar II disorder [F31.81]        Problem Dx:   Unspecified mood [affective] disorder [F39]

## 2024-08-21 NOTE — BH INPATIENT PSYCHIATRY PROGRESS NOTE - MSE OPTIONS
Pt walks in c/o RLQ pain since this afternoon. Pt states she had an IUD placed 9/23/20 and is concerned the pain is related. Pt denies vaginal bleeding, N/V/D, dysuria.
Unstructured MSE

## 2024-08-21 NOTE — BH PSYCHOLOGY - GROUP THERAPY NOTE - NSPSYCHOLGRPDBTPROB_PSY_A_CORE
anxiety/depressed mood/emotional dysregulation/suicidal ideation
anxiety/depressed mood/emotional dysregulation/suicidal ideation

## 2024-08-21 NOTE — BH INPATIENT PSYCHIATRY PROGRESS NOTE - NSBHCHARTREVIEWVS_PSY_A_CORE FT
Vital Signs Last 24 Hrs  T(C): 36.4 (08-21-24 @ 08:06), Max: 36.4 (08-20-24 @ 21:02)  T(F): 97.6 (08-21-24 @ 08:06), Max: 97.6 (08-21-24 @ 08:06)  HR: --  BP: --  BP(mean): --  RR: 16 (08-21-24 @ 08:06) (16 - 16)  SpO2: --    Orthostatic VS  08-21-24 @ 08:06  Lying BP: --/-- HR: --  Sitting BP: 106/68 HR: 98  Standing BP: 106/57 HR: 114  Site: --  Mode: --  Orthostatic VS  08-20-24 @ 08:16  Lying BP: --/-- HR: --  Sitting BP: 108/75 HR: 80  Standing BP: 113/80 HR: 100  Site: --  Mode: --   Vital Signs Last 24 Hrs  T(C): 36.1 (08-22-24 @ 08:08), Max: 36.4 (08-21-24 @ 20:26)  T(F): 96.9 (08-22-24 @ 08:08), Max: 97.5 (08-21-24 @ 20:26)  HR: --  BP: --  BP(mean): --  RR: --  SpO2: --    Orthostatic VS  08-22-24 @ 08:08  Lying BP: --/-- HR: --  Sitting BP: 117/68 HR: 94  Standing BP: 115/61 HR: 111  Site: --  Mode: --  Orthostatic VS  08-21-24 @ 08:06  Lying BP: --/-- HR: --  Sitting BP: 106/68 HR: 98  Standing BP: 106/57 HR: 114  Site: --  Mode: --  Orthostatic VS  08-20-24 @ 08:16  Lying BP: --/-- HR: --  Sitting BP: 108/75 HR: 80  Standing BP: 113/80 HR: 100  Site: --  Mode: --   Vital Signs Last 24 Hrs  T(C): 36.1 (08-22-24 @ 08:08), Max: 36.4 (08-21-24 @ 20:26)  T(F): 96.9 (08-22-24 @ 08:08), Max: 97.5 (08-21-24 @ 20:26)  HR: --  BP: --  BP(mean): --  RR: --  SpO2: --    Orthostatic VS  08-22-24 @ 08:08  Lying BP: --/-- HR: --  Sitting BP: 117/68 HR: 94  Standing BP: 115/61 HR: 111  Site: --  Mode: --  Orthostatic VS  08-21-24 @ 08:06  Lying BP: --/-- HR: --  Sitting BP: 106/68 HR: 98  Standing BP: 106/57 HR: 114  Site: --  Mode: --

## 2024-08-21 NOTE — BH INPATIENT PSYCHIATRY PROGRESS NOTE - NSBHFUPINTERVALHXFT_PSY_A_CORE
Patient with difficulty sleeping. No acute events overnight or psychiatric PRNs, but remains fairly obsessive about nightmares and sleep quality.     Patient was cooperative and calm during the interview. Her had a depressed affect and fatigue. She continues to report difficulty sleeping, waking up frequently over the course of the night due to nightmares. She shared that the nightmares feel vividly real and dramatically affect her emotional states. Pt admits to having excessive anxiety over making a pharmacological error at work although she is aware that there are many checks and balances to prevent medication errors. 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     Patient denies suicidal and homicidal ideation.      Patient with difficulty sleeping. No acute events overnight or psychiatric PRNs, but remains fairly obsessive about nightmares and sleep quality.     Patient was cooperative and calm during the interview. Patient states that slept better last night and is in a better mood. She is forward-thinking, excited to go to [email or web address]. Patient states that among the many skills she learned from therapy groups, she is excited to practice some of the self-care skills. She anticipates doing mindful exercises to unlock her potential and fill her "cup" in order to be able to help others and prevent burnout. She has learned about "acceptance of your reality" and strategies to combat excessive anxiety and over thinking. 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     Patient denies suicidal and homicidal ideation.      Patient with difficulty sleeping. No acute events overnight, took PO Ativan, Ambien and slept well.     Patient was cooperative and calm during the interview. Patient states that slept better last night and is in a better mood. She is forward-thinking, excited to go to [employer]. Patient states that among the many skills she learned from therapy groups, she is excited to practice some of the self-care skills. She anticipates doing mindful exercises to unlock her potential and fill her "cup" in order to be able to help others and prevent burnout. She has learned about "acceptance of your reality" and strategies to combat excessive anxiety and over thinking. 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     Patient denies suicidal and homicidal ideation.      Patient with difficulty sleeping. No acute events overnight, took PO Ativan, Ambien and slept well.     Patient was cooperative and calm during the interview. Patient states that slept better last night and is in a better mood. She is forward-thinking, excited to go to [employer]. Patient states that among the many skills she learned from therapy groups, she is excited to practice some of the self-care skills. She anticipates doing mindful exercises to unlock her potential and fill her "cup" in order to be able to help others and prevent burnout. She has learned about "acceptance of your reality" and strategies to combat excessive anxiety and over thinking. 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     Patient denies suicidal and homicidal ideation.

## 2024-08-22 VITALS — TEMPERATURE: 97 F

## 2024-08-22 PROCEDURE — 90832 PSYTX W PT 30 MINUTES: CPT

## 2024-08-22 PROCEDURE — 99239 HOSP IP/OBS DSCHRG MGMT >30: CPT | Mod: GC

## 2024-08-22 RX ADMIN — GLYCOPYRROLATE 2 MILLIGRAM(S): 0.2 INJECTION INTRAMUSCULAR; INTRAVENOUS at 08:51

## 2024-08-22 RX ADMIN — METOPROLOL TARTRATE 50 MILLIGRAM(S): 100 TABLET ORAL at 08:51

## 2024-08-22 RX ADMIN — Medication 137 MICROGRAM(S): at 06:54

## 2024-08-22 RX ADMIN — Medication 40 MILLIGRAM(S): at 08:51

## 2024-08-22 RX ADMIN — SERTRALINE HYDROCHLORIDE 50 MILLIGRAM(S): 50 TABLET, FILM COATED ORAL at 08:51

## 2024-08-22 NOTE — BH PSYCHOLOGY - CLINICIAN PSYCHOTHERAPY NOTE - NSBHPSYCHOLPROBS_PSY_ALL_CORE
Anxiety/Depression/Impulsivity/Self Injurious Behavior/Suicidality

## 2024-08-22 NOTE — BH PSYCHOLOGY - CLINICIAN PSYCHOTHERAPY NOTE - TOKEN PULL-DIAGNOSIS
Primary Diagnosis:  Bipolar II disorder [F31.81]        Problem Dx:   Unspecified mood [affective] disorder [F39]      
Primary Diagnosis:  Bipolar II disorder [F31.81]        Problem Dx:   Unspecified mood [affective] disorder [F39]      
Primary Diagnosis:  Bipolar II disorder [F31.81]        Problem Dx:   Insomnia [G47.00]      Anxiety [F41.9]      Unspecified mood [affective] disorder [F39]

## 2024-08-22 NOTE — BH INPATIENT PSYCHIATRY DISCHARGE NOTE - NSBHASSESSSUMMFT_PSY_ALL_CORE
suspecting Borderline Personality Disorder  to be the actual working diagnosis in this case  - hx of PE but not currently on anticoagulation; completed 6-month course as per records  - on Synthroid 137 mcg PO qd for Grave's dz (s/p thyroidectomy)  - nurse reported that Pt's boyfriend called the Unit saying that she is on a seizure medication; chart reviewed and there is no record of Pt ever having seizures but was given Trileptal 300mg PO x 1 dose in the Ed yesterday for "mood disorder." Ordered it   - Paxil 12.5mg CR theraputic exchange of Paxil 10mg po qd  suspecting Borderline Personality Disorder  to be the actual working diagnosis in this case  - hx of PE but not currently on anticoagulation; completed 6-month course as per records  - on Synthroid 137 mcg PO qd for Grave's dz (s/p thyroidectomy)  - nurse reported that Pt's boyfriend called the Unit saying that she is on a seizure medication; chart reviewed and there is no record of Pt ever having seizures but was given Trileptal 300mg PO x 1 dose in the Ed yesterday for "mood disorder." Ordered it   - Paxil 12.5mg CR therapeutic exchange of Paxil 10mg po qd  Patient is a 29 year old female with past psychiatric history of Bipolar II Disorder and OCD with no previous inpatient psychiatric hospitalization, denies hx of SA, remote self harm (last skin scratching in 2018), denies legal and substance use hx, BIB self to ED due to suicidal ideations. Patient resides with partner of 8 years and pet dog. 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 Patient endorses one week history of suicidal ideation with the intention to jump in front of a car, difficulty sleeping and fluctuations in mood. 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     Pt dc home today to follow up at [facility].      Plan:   -Legal: admit to  on 9.13 Voluntary, pt meets criteria for, and is expected to benefit from voluntary inpatient hosp.   -Safety: -Appropriate for routine checks, not expected to pose danger to self or others in controlled inpatient setting  #Psychiatric: focus on reducing complexity of meds to avoid interactions  -dc Trileptal 2/2 questionable utility  - Discontinue Paxil 12.5mg CR therapeutic exchange of Paxil 10mg po qd (some concern that paxil HS admin may be contributing to sleep issues)  - Increase Trazadone to 150mg Qhs, 8/21/24 -  (100mg Qhs for insomnia)  - Zoloft 25mg daily for anxiety/OCD increased to 50mg    -Agitation  PRNS: Haldol 5mg, Ativan 2mg, Benadryl 50mg PO/IM PRN for agitation, Zolpidem 5mg for sleep    - I/G/M therapy  #Medical: Graves, Hypothyroidism, PE  - on Synthroid 137 mcg PO qd for Grave's dz (s/p thyroidectomy)  - Glycopyrrolate 2mg BID  - resume metoprolol for SVT  Admit labs reviewed: CBC, CMP, TSH, Utox (-), UA, HCg: negative, Covid/RVP:negative   EKG: admit EKG reviewed: From Date 8/17/24 Rate:  NSR 66, QTC: 417ms  #collateral: obtained from boyfriend 8/19  - hx of PE but not currently on anticoagulation; completed 6-month course as per records

## 2024-08-22 NOTE — BH INPATIENT PSYCHIATRY DISCHARGE NOTE - MSE UNSTRUCTURED FT
Appearance: Dressed appropriately. Fair hygiene, dressed in street clothes  Attitude / Behavior: Cooperative   Eye contact: Fair   Motor: No PMA/PMR  Gait/station: Ambulating without issue  Speech: normal rate, rhythm, tone and volume. Fluent  Mood: "Im good"  Affect: stable, reactive.   Thought Process: Linear, goal directed  Thought Content: Discharge and coping skill focused No SIIP or HIIP reported.   Perceptual: No AVH   Attention: Fair - improving  Insight: fair - improving  Judgment: fair   Impulse Control: fair   Appearance: Dressed appropriately. Fair hygiene, dressed in street clothes  Attitude / Behavior: Cooperative   Eye contact: Fair   Motor: No PMA/PMR  Gait/station: Ambulating without issue  Speech: normal rate, rhythm, tone and volume. Fluent  Mood: "Im good"  Affect: stable, reactive.   Thought Process: Linear, goal directed  Thought Content: Discharge and coping skill focused No SIIP or HIIP reported.   Perceptual: No AVH   Attention: good  Insight: fair - improving  Judgment: fair   Impulse Control: intact

## 2024-08-22 NOTE — BH INPATIENT PSYCHIATRY DISCHARGE NOTE - NSBHDCHANDOFFFT_PSY_ALL_CORE
Handoff including circumstance surrounding admission, past hx, hospital course, medications, and after care recs provided to [facility] intake team, if questions regarding hospital course or patient come up following dc, please contact [facility] team at [facility] at [phone] or Dr. Bush at [phone]

## 2024-08-22 NOTE — BH DISCHARGE NOTE NURSING/SOCIAL WORK/PSYCH REHAB - PATIENT PORTAL LINK FT
You can access the FollowMyHealth Patient Portal offered by [facility] by registering at the following website: [email or web address]. By joining [facility] FollowMyHealth portal, you will also be able to view your health information using other applications (apps) compatible with our system.

## 2024-08-22 NOTE — BH PSYCHOLOGY - CLINICIAN PSYCHOTHERAPY NOTE - NSBHPSYCHOLNARRATIVE_PSY_A_CORE FT
Patient was alert, cooperative, and in control. Oriented x3. Casually dressed, fairly groomed. Maintained good eye contact. Speech normal in production, rate, volume, and tone. No abnormal psychomotor behavior. Mood "ok" with congruent affect. Thought process logical, goal-directed. Thought content relevant to discussion. Denied auditory/visual hallucinations and current suicidal/homicidal ideation, intent, plan, and urges to self-harm. Impulse control intact. Insight and judgment fair.     Focus of session is on her self-criticism related to taking care of her sister, and feelings of guilt. Urges associated with these feelings include isolating and going back to bed. Validation was provided, as well as discussion of opposite action in attending group and individual therapy. 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 Additionally, values and what gives her life meaning was discussed.
Patient was alert, cooperative, and in control. Oriented x3. Casually dressed, fairly groomed. Maintained good eye contact. Speech normal in production, rate, volume, and tone. No abnormal psychomotor behavior. Mood "ok" with congruent affect. Thought process logical, goal-directed. Thought content relevant to discussion. Denied auditory/visual hallucinations and current suicidal/homicidal ideation, intent, plan, and urges to self-harm. Impulse control intact. Insight and judgment fair. Committed to remaining safe on the unit and informing staff if unable to manage behaviors.     Focus of session is on conducting behavior chain analysis on events leading to current hospitalization. 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 In addition, patient discussed increased anxiety, such as racing thoughts, chest pain, muscle tightness, and subsequent lack of sleep. Patient reported anxiety and lack of sleep increasing in the past 2 weeks. Patient reported isolating herself, and not engaging in "self-care." Validation was provided and solution analysis implemented. 
Patient was alert, cooperative, and in control. Oriented x3. Casually dressed, fairly groomed. Maintained good eye contact. Speech normal in production, rate, volume, and tone. No abnormal psychomotor behavior. Mood "ok" with congruent affect. Thought process logical, goal-directed. Thought content relevant to discussion. Denied auditory/visual hallucinations and current suicidal/homicidal ideation, intent, plan, and urges to self-harm. Impulse control intact. Insight and judgment fair.     Patient expressed motivation for discharge, was future-oriented, believes she has benefited from hospitalization, and committed to not attempting suicide for at least 6 months. Focus of session was on generating safety plan and coping ahead for effective discharge. With encouragement, pt identified warning signs, coping skills, and external supports that they can use to maintain safety and stability outside of the hospital after discharge. Discussion also centered on how pt can help keep the environment safe. Please see  Safety Plan for further detail.

## 2024-08-22 NOTE — BH DISCHARGE NOTE NURSING/SOCIAL WORK/PSYCH REHAB - NSBHDCAGENCY1FT_PSY_A_CORE
[facility]- Intake with  &  [facility]- Intake with  & Marcia Aparicio

## 2024-08-22 NOTE — BH INPATIENT PSYCHIATRY DISCHARGE NOTE - NSBHMETABOLIC_PSY_ALL_CORE_FT
BMI: BMI (kg/m2): 33.2 (07-24-24 @ 18:09)  HbA1c:   Glucose:   BP: --Vital Signs Last 24 Hrs  T(C): 36.1 (08-22-24 @ 08:08), Max: 36.4 (08-21-24 @ 20:26)  T(F): 96.9 (08-22-24 @ 08:08), Max: 97.5 (08-21-24 @ 20:26)  HR: --  BP: --  BP(mean): --  RR: --  SpO2: --    Orthostatic VS  08-22-24 @ 08:08  Lying BP: --/-- HR: --  Sitting BP: 117/68 HR: 94  Standing BP: 115/61 HR: 111  Site: --  Mode: --  Orthostatic VS  08-21-24 @ 08:06  Lying BP: --/-- HR: --  Sitting BP: 106/68 HR: 98  Standing BP: 106/57 HR: 114  Site: --  Mode: --    Lipid Panel:

## 2024-08-22 NOTE — BH INPATIENT PSYCHIATRY DISCHARGE NOTE - NSDCMRMEDTOKEN_GEN_ALL_CORE_FT
glycopyrrolate 2 mg oral tablet: 1 tab(s) orally 2 times a day  levothyroxine 137 mcg (0.137 mg) oral tablet: 1 tab(s) orally once a day  metoprolol tartrate 50 mg oral tablet: 1 tab(s) orally once a day  pantoprazole 40 mg oral delayed release tablet: 1 tab(s) orally once a day (before a meal)  polyethylene glycol 3350 oral powder for reconstitution: 17 gram(s) orally once a day As needed Constipation  sertraline 50 mg oral tablet: 1 tab(s) orally once a day  traZODone 150 mg oral tablet: 1 tab(s) orally once a day (at bedtime) as needed for  insomnia  zolpidem 5 mg oral tablet: 1 tab(s) orally once a day (at bedtime) As needed Insomnia

## 2024-08-22 NOTE — BH INPATIENT PSYCHIATRY DISCHARGE NOTE - HOSPITAL COURSE
Patient is a 29 year old female with past psychiatric history of Bipolar II Disorder and OCD with no previous inpatient psychiatric hospitalization, no SA, remote self harm (last skin scratching in 2018)  BIB self to ED due to suicidal ideations. Patient resides with partner of 8 years and pet dog. 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     Patient initially presented with depressed mood and suicidal ideation in the context of excessive anxiety, work-related stress and the  insomnia. Patient endorses one week history of suicidal ideation with the intention to jump in front of a car. Days leading up to the impatient admission, patients was feeling overwhelmed by her internship and upcoming school semester. She described her internship as demanding with difficulty adjusting to her roles, responsibility and workplace social dynamics. Patient states that she fantasizes of excuses to miss work, like "falling off the stairs and breaking a bone" or " smashing into the glass table". Patient has excessive anxiety about making medication errors at work. She also endorsed difficulty sleeping since May, waking up frequently over the course of the night due anxiety and nightmares. She was sleeping 20-25 minute intervals before waking up from a nightmare. Her current work schedule is from 7am to 5pm which also she states interferes with her sleep as she has to wake up at 4am to commute to work.     On presentation, patient suggested that the recent change in her medications could be causing a chemical imbalance and suicidal ideation. Patient discontinued Ga    Since May, she has been having difficulty staying asleep often waking up 3-4 hours after falling asleep. Of note, patient has family hx of father committing suicide in May 2019 due to PTSD from 9/11. Patient states that since high school, she grew increasingly close to her father, describing him as her "best friend". Patient is fearful of becoming like her father given his struggle with mental health and draws parallels to her father's excessive spending habits to her hyper-fixation on spending money that she does not have (patient admits is actively working to resolve this). Patient has a childhood trauma of possible kidnap attempt which she minimizes stating that it is "fine"    Patient states that since admissions to , she has been feeling less suicidal. She has been attending group therapy sessions to learn coping skills. She theorized that her suicidal ideation may be attributed to recent changes in her medications. She described feeling dysregulated since abruptly stopping quetiapine 100mg (due to facial muscle spasm) about one week ago, starting Ambien 10mg (due to trouble sleeping) and decreased sleep due to internship schedule. She also endorses vivid dreams and fluctuations in her moods.        Patient is a 29 year old female with past psychiatric history of Bipolar II Disorder and OCD with no previous inpatient psychiatric hospitalization, no SA, remote self harm (last skin scratching in 2018)  BIB self to ED due to suicidal ideations. Patient resides with partner of 8 years and pet dog. 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 Patient has     Patient initially presented with depressed mood, anhedonia, decreased energy and suicidal ideation in the context of excessive anxiety, work-related stress and the insomnia. Patient endorsed one week history of escalating suicidal ideation with the intention to jump in front of a car. Days leading up to her impatient admission, patients was feeling overwhelmed by her internship and upcoming school semester. She described her internship as demanding and expressed difficulty adjusting to her roles, responsibility and workplace social dynamics. She also has overwhelming anxiety about making medication errors at work. Patient states that she often fantasizes of finding excuses to miss work, like "falling off the stairs and breaking a bone" or " smashing into the glass table".  Additionally, patient endorsed difficulty sleeping since May, waking up frequently over the course of the night due anxiety and nightmares. She sleeps about 20-25 minute intervals before waking up from vivid nightmare and then having to wake up at 4am for a 7am work shift. On presentation, patient suggested that the recent changes in her medications could be a major contributing factor in her mood dysregulation and suicidal ideation.  She described feeling dysregulated since abruptly stopping quetiapine 100mg (due to facial muscle spasm) about one week ago, starting Ambien 10mg (due to trouble sleeping) and decreased sleep due to internship schedule.     Due to patient's presenting symptoms, her medication regimen was simplified with the intention of minimizing any medication interactions. Her paxil was discontinued and she was prescribed and zoloft and trazadone (dose titrated up to 150mg Qhs) for anxiety and insomnia. Over the duration of her admissions, she actively participated in individual, group and milieu therapies and was able to implement skills learned in therapy appropriately. With skills she learned and pharmacological support,, patient was able to obtain restful sleep. Her mood improved significantly with improved sleep. On the unit, patient was seen increasingly engaging with her peers in conversation. As she gained more DBT skills, patient became insightful about her anxiety and reflected on coping skills she can use to navigate through these feelings. She shared that she plans on doing mindfulness exercises, and when overwhelmed, listing out the cause of her anxiety and worst outcomes anticipated. She was able to reflect on her work dynamic and states recognized     On the day of discharge, patient reported feeling less anxious and more excited to go home. Patient denied having any suicidal or homicidal ideations. She is forward thinking, excited to attend to attend an Anime conference with her boyfriend on Friday. She anticipates that navigating through the school year and her academic classes will be challenging but she feel in her outpatient therapy services. The patient and her boyfriend were engaged in safety planning, both expressed willingness to seek emergency help if necessary. They denied any safety concerns related to discharge and verbalized their readiness to call 911 or go to the ER if the patient felt she was a threat to herself and others.     Risk Assessment: The patient is at elevated chronic risk for suicide/self harm, but low acute risk for suicide and self harm. Static risk factors include psychiatric illness dx of anxiety in the context of work-related stress and insomnia. Acute risk factors present on admission but mitigated during hospitalization include elevated anxiety, mood dysregulation, suicidal ideation, interpersonal stress related to work and school. Protective factors include; good response to tx, forward thinking regarding anime conference, boyfriend, decrease in work hours when school starts. Given protective factors, and that acute risk factors present on admission have been addressed and are no longer present at LA, patient has returned to baseline level of acute risk and the patient no longer requires inpatient hospitalization for stabilization or safety. The patient is therefore appropriate for dc to outpatient care. Chronic risk can be further mitigated by continued engagement with outpatient tx.  At the time of dc the patient engaged meaningfully in safety planning and was dc home to outpatient tx.          Discharge Diagnosis: Anxiety      Discharge Medications (the patient was provided with a 14 day supply of psychiatric medications upon dc):     diphenhydrAMINE 50 milliGRAM(s) Oral every 6 hours PRN  diphenhydrAMINE Injectable 50 milliGRAM(s) IntraMuscular once PRN  glycopyrrolate 2 milliGRAM(s) Oral two times a day  haloperidol    Injectable 5 milliGRAM(s) IntraMuscular Once PRN  levothyroxine 137 MICROGram(s) Oral daily  LORazepam     Tablet 2 milliGRAM(s) Oral every 6 hours PRN  LORazepam   Injectable 2 milliGRAM(s) IntraMuscular Once PRN  magnesium hydroxide Suspension 30 milliLiter(s) Oral daily PRN  metoprolol tartrate 50 milliGRAM(s) Oral daily  pantoprazole    Tablet 40 milliGRAM(s) Oral before breakfast  polyethylene glycol 3350 17 Gram(s) Oral daily PRN  sertraline 50 milliGRAM(s) Oral daily  traZODone 150 milliGRAM(s) Oral at bedtime  zolpidem 5 milliGRAM(s) Oral at bedtime PRN  zolpidem 5 milliGRAM(s) Oral at bedtime PRN    glycopyrrolate 2 mg oral tablet: 1 tab(s) orally 2 times a day  levothyroxine 137 mcg (0.137 mg) oral tablet: 1 tab(s) orally once a day  metoprolol tartrate 50 mg oral tablet: 1 tab(s) orally once a day  pantoprazole 40 mg oral delayed release tablet: 1 tab(s) orally once a day (before a meal)  polyethylene glycol 3350 oral powder for reconstitution: 17 gram(s) orally once a day As needed Constipation  sertraline 50 mg oral tablet: 1 tab(s) orally once a day  traZODone 150 mg oral tablet: 1 tab(s) orally once a day (at bedtime) as needed for  insomnia  zolpidem 5 mg oral tablet: 1 tab(s) orally once a day (at bedtime) As needed Insomnia   Patient is a 29 year old female with past psychiatric history of "Bipolar II" Disorder and OCD with no previous inpatient psychiatric hospitalization, no SA, remote self harm (last skin scratching in 2018)  BIB self to ED due to suicidal ideations. Patient resides with partner of 8 years and pet dog. 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     Patient initially presented with depressed mood, anhedonia, decreased energy and suicidal ideation in the context of excessive anxiety, work-related stress and the insomnia. Patient endorsed one week history of escalating suicidal ideation with the intention to jump in front of a car. Days leading up to her impatient admission, patients was feeling overwhelmed by her internship and upcoming school semester. She described her internship as demanding and expressed difficulty adjusting to her roles, responsibility and workplace social dynamics. She also has overwhelming anxiety about making medication errors at work. Patient states that she often fantasizes of finding excuses to miss work, like "falling off the stairs and breaking a bone" or " smashing into the glass table".  Additionally, patient endorsed difficulty sleeping since May, waking up frequently over the course of the night due anxiety and nightmares (not related to trauma which pt denies experiencing). She sleeps about 20-25 minute intervals before waking up from vivid nightmare and then having to wake up at 4am for a 7am work shift. On presentation, patient suggested that the recent changes in her medications could be a major contributing factor in her mood dysregulation and suicidal ideation.  She described feeling dysregulated since abruptly stopping quetiapine 100mg (due to facial muscle spasm) about one week ago, starting Ambien 10mg (due to trouble sleeping) and decreased sleep due to internship schedule.     Due to patient's presenting symptoms, her medication regimen was simplified with the intention of minimizing any medication interactions. Her paxil and trileptal were discontinued and she was prescribed and zoloft and trazodone (dose titrated up to 150mg Qhs) for anxiety and insomnia. Over the duration of her admissions, she actively participated in individual, group and milieu therapies and was able to implement skills learned in therapy appropriately. With skills she learned and pharmacological support,, patient was able to obtain restful sleep. Her mood improved significantly with improved sleep. On the unit, patient was seen increasingly engaging with her peers in conversation. As she gained more DBT skills, patient became insightful about her anxiety and reflected on coping skills she can use to navigate through these feelings. She shared that she plans on doing mindfulness exercises, and when overwhelmed, listing out the cause of her anxiety and worst outcomes anticipated. She was able to reflect on her work dynamic and states recognized     On the day of discharge, patient reported feeling less anxious and more excited to go home. Patient denied having any suicidal or homicidal ideations. She is forward thinking, excited to attend to attend an Anime conference with her boyfriend on Friday. She anticipates that navigating through the school year and her academic classes will be challenging but she feel in her outpatient therapy services. The patient and her boyfriend were engaged in safety planning, both expressed willingness to seek emergency help if necessary. They denied any safety concerns related to discharge and verbalized their readiness to call 911 or go to the ER if the patient felt she was a threat to herself and others.     It seems likely that polypharmacy was playing some role in insomnia, Ambien may have accounted for some of the changes in dreams of late, and the inconsistent way in which she took Protonix and synthroid at home may have also had some role. The patient also has an obsessive flavor to the way she worries about sleep and thus she was recommended to pursue a CBT for insomnia program online after dc. By the day of dc her sleep was good with trazodone 150mg, and as she felt this was her most bothersome sx, she felt it was the issue leading her towards SI which was present prior to admission but not during hospitalization. At no point during admit did patient endorse any SI or engage in any behaviors that would raise concern for acute safety.     Risk Assessment: The patient is at mild-moderately elevated chronic risk for suicide/self harm, but low acute risk for suicide and self harm. Static risk factors include psychiatric illness dx of anxiety and likely BPD (though given short hospitalization this was not discussed extensively with patient), in the context of work-related stress and insomnia. Acute risk factors present on admission but mitigated during hospitalization include elevated anxiety, anergia, mood dysregulation, suicidal ideation, severe sleep disturbance, interpersonal stress related to work and school. Protective factors include; good response to tx, sleep improved over admit, pt took well to skill development and consolidation, she was forward thinking regarding anime conference, boyfriend, decrease in work hours when school starts. Given protective factors, and that acute risk factors present on admission have been addressed and are no longer present at dc, patient has returned to baseline level of acute risk and the patient no longer requires inpatient hospitalization for stabilization or safety. The patient is therefore appropriate for dc to outpatient care. Chronic risk can be further mitigated by continued engagement with outpatient tx.  At the time of dc the patient engaged meaningfully in safety planning and was dc home to outpatient tx.        Discharge Diagnosis: Anxiety    Discharge Medications (the patient was provided with a 14 day supply of psychiatric medications upon dc):     Medical:   glycopyrrolate 2 mg oral tablet: 1 tab(s) orally 2 times a day  levothyroxine 137 mcg (0.137 mg) oral tablet: 1 tab(s) orally once a day  metoprolol tartrate 50 mg oral tablet: 1 tab(s) orally once a day  pantoprazole 40 mg oral delayed release tablet: 1 tab(s) orally once a day (before a meal)  polyethylene glycol 3350 oral powder for reconstitution: 17 gram(s) orally once a day As needed Constipation    Psych:   sertraline 50 mg oral tablet: 1 tab(s) orally once a day for anxiety, mood, possible OCD  traZODone 150 mg oral tablet: 1 tab(s) orally once a day (at bedtime) as needed for  insomnia    Attending spoke with outpt psychiatrist by phone who shared similar thinking around patient and he was pleased that patient was linked with in-person care as she had been looking for this for some time.

## 2024-08-22 NOTE — BH PSYCHOLOGY - CLINICIAN PSYCHOTHERAPY NOTE - NSBHPSYCHOLGOALS_PSY_A_CORE
Decrease symptoms/Improve social/vocational/coping skills/Psychoeducation
Decrease symptoms/Improve social/vocational/coping skills/Psychoeducation
Decrease symptoms/Assessment/Improve social/vocational/coping skills/Psychoeducation

## 2024-08-22 NOTE — BH INPATIENT PSYCHIATRY DISCHARGE NOTE - HPI (INCLUDE ILLNESS QUALITY, SEVERITY, DURATION, TIMING, CONTEXT, MODIFYING FACTORS, ASSOCIATED SIGNS AND SYMPTOMS)
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 Azael Butler via telehealth at [facility] and biweekly psychotherapy with Giselle at [facility], denies hx of violence/legal issues, PMHx of Graves' disease (s/p total thyroidectomy in 2023), SVT, hyperhydrosis, GERD, and hx of B/L PE in 2022, BIB by self to [facility] ED on 24, referred by outpatient psychiatrist, for suicidal behavior and worsening mood. In the ED, Patient calm and cooperative, reported waking up at 4:00 am this morning to go to work, became hysterical after she believed she her dog was having a seizure. Patient states she then drove to the train station, entered the train and got off at [facility]. Patient states she became upset when she realized that train was taking alternative route due to weekend schedule. Patient states she returned home. After getting off the train, patient decided to walk into the train with the intent to harm herself, but stopped herself because she did not want to inconvenience the .  Patient states that she then went home, attempted to fall asleep, and informed her psychiatrist, who recommended that she be evaluated in the ED. Prior to the last week, patient states she that her mood had been "down", but not as severely depressed as current mood. Patient attributes this psychosocial stressors, which she identifies is primarily due to school/work.    Over the last week, patient reports fluctuations in mood sxs. Patient reports experiencing depressive sxs (amotivation, anergia, poor concentration, passive SI, poor self-care) and reports "bouts of energy" in which she states she has been "listening to music and getting really into it". Patient denies additional symptoms of mayo including decreased need for sleep (reports sleeping 7-8 hrs/night), increased goal-directed activity, grandiosity.  Within the last week, patient states she has been ruminating on different plans to end her life (SA via OD, stabbing herself with machete in the home). Patient endorses vague AH, occurring over the last week, in which she describes "hearing her partner's phone buzzing", "heard her mom screaming", and "heard my partner telling me that my aunt had ". Denies previous hx of perceptual disturbances. Patient denies additional psychotic sxs including IOR, thought insertion/withdrawal, etc. Patient endorses vague hx of paranoia, in which she states she previously was concerned that people were able to see/watch her from her seventh floor apartment. When asked about suicidality, patient discloses that intent of recent suicidal behavior was not performed with the intent to end her life, as patient states she "I didn't want to die, I thought it would injure me to the point of hospitalization" and "I didn't want to be in society". Patient is elusive about status of current suicidality. When asked about current SI, patient states "well there are no cars here". Patient unable to engage in safety planning, as she states "I'll try to", when asked if she believes that she can remain safe, if discharged from the hospital.    Collateral from partner, Dwayne: Reports patient has poor sleep, inconsolably crying over the last several days. States that patient had chronic hx of endorsing SI, but no safety concerns until the last few days, at which time patient's mood sxs became worse. Over the last month, reports that patient has been stressed and anxious due to school and work, recently abruptly took the month off, as a result.    ON UNIT 1S: calm, cooperative, dressed in regular clothing, just showered and brushed teeth. reports that she did not sleep well last night, got the medications "too late" and she baseline her poor sleep due to frequent awakenings due to dreams. reports low mood, anxiousness, feels safe on the Unit. Ate breakfast and went for AM group.     ISTOP| Reference #: [id number]  2024	zolpidem tartrate 10 mg tablet	30	30	Simon Renae MD	[id number]	Insurance	[facility] #[id number]  2024	dexmethylphenidate er 10 mg cp	60	30	Khris Martinez	[id number]	Insurance	[facility] #[id number]  2024	dexmethylphenidate er 10 mg cp	15	15	Khris Martinez	[id number]	Insurance	[facility] #[id number]  10/21/2023	10/24/2023	azstarys 39.2 mg-7.8 mg cap	30	30	Mehul Ennis	[id number]	Insurance	[facility] #[id number]  2023	oxycodone hcl (ir) 5 mg tablet	12	2	Franco Ramsey [facility]	[id number]	Insurance	[facility] #[id number]  2023	azstarys 39.2 mg-7.8 mg cap	30	30	Mehul Ennis	[id number]	[facility] #[id number]

## 2024-08-22 NOTE — BH INPATIENT PSYCHIATRY DISCHARGE NOTE - NSBHFUPINTERVALCCFT_PSY_A_CORE
Suicidal Ideation Pt seen on day of dc for suicidal Ideation   Discharge Progress Note Date and Time: 08-22-24 @ 11:25

## 2024-08-22 NOTE — BH DISCHARGE NOTE NURSING/SOCIAL WORK/PSYCH REHAB - NSCDUDCCRISIS_PSY_A_CORE
[facility]  1 [phone]) [facility]LL ([phone])  Text "WELL" to 04753  Website: www.[employer]/.[facility] 1 (204) 621-HOPE (1841) Website: www.safehorizon.org/.  [facility] Crisis [facility] for Children, Adults and Families  [address] [place] [address]  Mobile Crisis Hotline – (192) 336-3843/.National Suicide Prevention Lifeline 5 (741) 625-9393/.  [facility]  1 (538) LIFENET (519-3584)/.  [facility]  (540) 415-2319/.  [facility] Behavioral Health Helpline / [facility] Mobile Crisis  (086) 125-JQHP (3930)/.  [facility] Response Crisis Hotline  (212) 955-7611  24 hour telephone crisis intervention and suicide prevention hotline concerned with all mental health issues/.  [facility]  [address] [place] [address] [phone]   Hours:  Monday through Friday from 9 AM to 3 PM/988 Suicide and Crisis Lifeline

## 2024-08-22 NOTE — BH DISCHARGE NOTE NURSING/SOCIAL WORK/PSYCH REHAB - NSDCPRGOAL_PSY_ALL_CORE
Patient met psychiatric rehabilitation goal of demonstrating medication adherence. Patient attended most psychiatric rehabilitation groups and was engaged appropriately during sessions. Patient was able to engage in safety planning prior to discharge.

## 2024-08-22 NOTE — BH INPATIENT PSYCHIATRY DISCHARGE NOTE - NSBHFUPINTERVALHXFT_PSY_A_CORE
Chart reviewed, case discussed in team. No acute events overnight, pt reports sleeping well taking just Trazodone and did not require additional PRNS. Patient is very pleased with this and is excited to be dc today and to start treatment that will include more therapy that is skills oriented once she links with CP. Patient commits to safety on dc for next year, further endorses she would absolutely return to the hospital before trying to harm self in the event this were to come up in the future. She engages well in safety planning and psychoed re medications and sleep hygiene. Denies notable anxiety or low mood today.     Patient denies suicidal and homicidal ideation.

## 2024-08-22 NOTE — BH DISCHARGE NOTE NURSING/SOCIAL WORK/PSYCH REHAB - DISCHARGE INSTRUCTIONS AFTERCARE APPOINTMENTS
In order to check the location, date, or time of your aftercare appointment, please refer to your Discharge Instructions Document given to you upon leaving the hospital.  If you have lost the instructions please call [phone]

## 2024-08-22 NOTE — BH DISCHARGE NOTE NURSING/SOCIAL WORK/PSYCH REHAB - NSDCPRRECOMMEND_PSY_ALL_CORE
Psychiatric Rehabilitation staff recommends that patient continue to demonstrate medication adherence and utilize coping skills for improved symptom management and sustained recovery.

## 2024-08-22 NOTE — BH INPATIENT PSYCHIATRY DISCHARGE NOTE - OTHER PAST PSYCHIATRIC HISTORY (INCLUDE DETAILS REGARDING ONSET, COURSE OF ILLNESS, INPATIENT/OUTPATIENT TREATMENT)
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 Patient is diagnosed with Bipolar II Disorder. She has a remote history of scratching herself. The patient has no previous inpatient psychiatric hospitalizations. There is no reported history of substance abuse, aggression, or legal issues. She is in treatment with Dr. Azael Butler at [facility] and has biweekly therapy with Kyra at [facility], the numbers are not available to this writer at this time. The patient, reportedly, has chronic suicidal thoughts, but there has been no actual concern until the few days heading into this hospitalization. The patient's partner reported that she has not been sleeping well, has been crying, anxious, and suddenly took a month off from work and school. The patient stated that she has been had symptoms of depression including poor adl care, motivation and concentration with anergia. These have been mixed with periods of energy. The patient has had passive SI with thoughts to overdose or stab herself with a machete. Just prior to admission the patient was at the train and thought to jump in front. She stopped herself, went home, and tried to sleep. Then she called her psychiatrist and told him what had happened and how she was feeling and he had her come to the ED for evaluation. The patient reported a history of feeling like people were watching her and she has had AH in which she heard sounds and information. Patient has been calm and cooperative on the unit but continues to report poor mood and anxiety. She feels safe on the unit however. Patient will, likely return to previous outpatient providers. She has [employer] P[employer]/IND.

## 2024-08-22 NOTE — BH DISCHARGE NOTE NURSING/SOCIAL WORK/PSYCH REHAB - NSBHDCADDR1FT_A_CORE
[id number] [facility] [address]  [facility]- 1st floor  Please Use Door 3

## 2024-09-10 ENCOUNTER — EMERGENCY (EMERGENCY)
Facility: HOSPITAL | Age: 29
LOS: 1 days | Discharge: HOME | End: 2024-09-10
Attending: STUDENT IN AN ORGANIZED HEALTH CARE EDUCATION/TRAINING PROGRAM | Admitting: STUDENT IN AN ORGANIZED HEALTH CARE EDUCATION/TRAINING PROGRAM
Payer: COMMERCIAL

## 2024-09-10 VITALS
OXYGEN SATURATION: 100 % | TEMPERATURE: 97 F | SYSTOLIC BLOOD PRESSURE: 122 MMHG | WEIGHT: 220.02 LBS | DIASTOLIC BLOOD PRESSURE: 81 MMHG | RESPIRATION RATE: 18 BRPM | HEART RATE: 92 BPM

## 2024-09-10 LAB
ADD ON TEST-SPECIMEN IN LAB: SIGNIFICANT CHANGE UP
ALBUMIN SERPL ELPH-MCNC: 4 G/DL — SIGNIFICANT CHANGE UP (ref 3.3–5)
ALP SERPL-CCNC: 98 U/L — SIGNIFICANT CHANGE UP (ref 40–120)
ALT FLD-CCNC: 18 U/L — SIGNIFICANT CHANGE UP (ref 4–33)
ANION GAP SERPL CALC-SCNC: 14 MMOL/L — SIGNIFICANT CHANGE UP (ref 7–14)
AST SERPL-CCNC: 26 U/L — SIGNIFICANT CHANGE UP (ref 4–32)
BASOPHILS # BLD AUTO: 0.03 K/UL — SIGNIFICANT CHANGE UP (ref 0–0.2)
BASOPHILS NFR BLD AUTO: 0.3 % — SIGNIFICANT CHANGE UP (ref 0–2)
BILIRUB SERPL-MCNC: 0.2 MG/DL — SIGNIFICANT CHANGE UP (ref 0.2–1.2)
BUN SERPL-MCNC: 10 MG/DL — SIGNIFICANT CHANGE UP (ref 7–23)
CALCIUM SERPL-MCNC: 8.5 MG/DL — SIGNIFICANT CHANGE UP (ref 8.4–10.5)
CHLORIDE SERPL-SCNC: 104 MMOL/L — SIGNIFICANT CHANGE UP (ref 98–107)
CO2 SERPL-SCNC: 20 MMOL/L — LOW (ref 22–31)
CREAT SERPL-MCNC: 0.86 MG/DL — SIGNIFICANT CHANGE UP (ref 0.5–1.3)
EGFR: 95 ML/MIN/1.73M2 — SIGNIFICANT CHANGE UP
EOSINOPHIL # BLD AUTO: 0.13 K/UL — SIGNIFICANT CHANGE UP (ref 0–0.5)
EOSINOPHIL NFR BLD AUTO: 1.3 % — SIGNIFICANT CHANGE UP (ref 0–6)
GLUCOSE SERPL-MCNC: 88 MG/DL — SIGNIFICANT CHANGE UP (ref 70–99)
HCT VFR BLD CALC: 42.5 % — SIGNIFICANT CHANGE UP (ref 34.5–45)
HGB BLD-MCNC: 14.4 G/DL — SIGNIFICANT CHANGE UP (ref 11.5–15.5)
IANC: 5.13 K/UL — SIGNIFICANT CHANGE UP (ref 1.8–7.4)
IMM GRANULOCYTES NFR BLD AUTO: 0.1 % — SIGNIFICANT CHANGE UP (ref 0–0.9)
LIDOCAIN IGE QN: 38 U/L — SIGNIFICANT CHANGE UP (ref 7–60)
LYMPHOCYTES # BLD AUTO: 3.96 K/UL — HIGH (ref 1–3.3)
LYMPHOCYTES # BLD AUTO: 39.6 % — SIGNIFICANT CHANGE UP (ref 13–44)
MAGNESIUM SERPL-MCNC: 2 MG/DL — SIGNIFICANT CHANGE UP (ref 1.6–2.6)
MCHC RBC-ENTMCNC: 30.6 PG — SIGNIFICANT CHANGE UP (ref 27–34)
MCHC RBC-ENTMCNC: 33.9 GM/DL — SIGNIFICANT CHANGE UP (ref 32–36)
MCV RBC AUTO: 90.2 FL — SIGNIFICANT CHANGE UP (ref 80–100)
MONOCYTES # BLD AUTO: 0.74 K/UL — SIGNIFICANT CHANGE UP (ref 0–0.9)
MONOCYTES NFR BLD AUTO: 7.4 % — SIGNIFICANT CHANGE UP (ref 2–14)
NEUTROPHILS # BLD AUTO: 5.13 K/UL — SIGNIFICANT CHANGE UP (ref 1.8–7.4)
NEUTROPHILS NFR BLD AUTO: 51.3 % — SIGNIFICANT CHANGE UP (ref 43–77)
NRBC # BLD: 0 /100 WBCS — SIGNIFICANT CHANGE UP (ref 0–0)
NRBC # FLD: 0 K/UL — SIGNIFICANT CHANGE UP (ref 0–0)
PHOSPHATE SERPL-MCNC: 2.6 MG/DL — SIGNIFICANT CHANGE UP (ref 2.5–4.5)
PLATELET # BLD AUTO: 245 K/UL — SIGNIFICANT CHANGE UP (ref 150–400)
POTASSIUM SERPL-MCNC: 4.4 MMOL/L — SIGNIFICANT CHANGE UP (ref 3.5–5.3)
POTASSIUM SERPL-SCNC: 4.4 MMOL/L — SIGNIFICANT CHANGE UP (ref 3.5–5.3)
PROT SERPL-MCNC: 6.2 G/DL — SIGNIFICANT CHANGE UP (ref 6–8.3)
RBC # BLD: 4.71 M/UL — SIGNIFICANT CHANGE UP (ref 3.8–5.2)
RBC # FLD: 11.9 % — SIGNIFICANT CHANGE UP (ref 10.3–14.5)
SODIUM SERPL-SCNC: 138 MMOL/L — SIGNIFICANT CHANGE UP (ref 135–145)
WBC # BLD: 10 K/UL — SIGNIFICANT CHANGE UP (ref 3.8–10.5)
WBC # FLD AUTO: 10 K/UL — SIGNIFICANT CHANGE UP (ref 3.8–10.5)

## 2024-09-10 PROCEDURE — 93010 ELECTROCARDIOGRAM REPORT: CPT | Mod: 76

## 2024-09-10 PROCEDURE — 99285 EMERGENCY DEPT VISIT HI MDM: CPT

## 2024-09-10 RX ORDER — ONDANSETRON 2 MG/ML
4 INJECTION, SOLUTION INTRAMUSCULAR; INTRAVENOUS ONCE
Refills: 0 | Status: COMPLETED | OUTPATIENT
Start: 2024-09-10 | End: 2024-09-10

## 2024-09-10 RX ADMIN — Medication 1000 MILLILITER(S): at 20:49

## 2024-09-10 RX ADMIN — ONDANSETRON 4 MILLIGRAM(S): 2 INJECTION, SOLUTION INTRAMUSCULAR; INTRAVENOUS at 23:37

## 2024-09-10 NOTE — ED PROVIDER NOTE - PATIENT PORTAL LINK FT
You can access the FollowMyHealth Patient Portal offered by [facility] by registering at the following website: [email or web address]. By joining [facility]’s FollowMyHealth portal, you will also be able to view your health information using other applications (apps) compatible with our system.

## 2024-09-10 NOTE — ED PROVIDER NOTE - PROGRESS NOTE DETAILS
Received signout from previous attending pending CT angiography to rule out PE.  There is no pulmonary embolus.  Workup otherwise negative.  Patient stable for discharge.    (Timur Dumont MD; attending emergency medicine and medical toxicology)

## 2024-09-10 NOTE — ED PROVIDER NOTE - NSDCPRINTRESULTS_ED_ALL_ED
Periodic breathing
Patient requests all Lab, Cardiology, and Radiology Results on their Discharge Instructions

## 2024-09-10 NOTE — ED PROVIDER NOTE - CLINICAL SUMMARY MEDICAL DECISION MAKING FREE TEXT BOX
29-year-old female with history of PE that she was told most likely secondary to her birth control pills a year ago not on AC anymore.  Patient is presenting with nausea, vomiting ongoing for past couple of days.  Feeling of generalized weakness.  Also endorses for past couple of weeks she has been having shortness of breath and mild intermittent chest pain.  No pain or swelling in lower extremities.  Patient is well-appearing.  No distress.  Clear to auscultation bilaterally.  Abdomen soft nontender.  No calf swelling or tenderness to palpation.  Facial diagnoses include but not limited to electrolyte abnormalities, PE, viral illness.  Abdomen is soft and nontender hold off on imaging at this time.

## 2024-09-10 NOTE — ED ADULT NURSE NOTE - EXTENSIONS OF SELF_ADULT
Abridged Text (If No Specifics Are Selected): Educational resources were provided and detailed information can be found on the patient education handout. Detail Level: Simple Render Patient Education In Note?: render abridged patient education None

## 2024-09-10 NOTE — ED ADULT NURSE REASSESSMENT NOTE - NS ED NURSE REASSESS COMMENT FT1
Patient is AOx4 and in no signs of acute distress. Family at bedside. Patient pending CT scan. Comfort measures maintained. Bed in lowest position. Safety maintained.

## 2024-09-10 NOTE — ED ADULT NURSE NOTE - OBJECTIVE STATEMENT
Pt c/o dizziness, nausea, and generalized weakness x3-4days. Pt also reports dyspnea on exertion. Denies chest pain/SOB at present. Respirations even and unlabored. No apparent distress noted at this time. 22g IV placed in R hand, labs drawn as ordered. LR bolus infusing as ordered. Safety maintained. Awaiting further orders.

## 2024-09-10 NOTE — ED ADULT TRIAGE NOTE - BEFAST FACE
Removed IV, tip intact. Removed tele, no ectopy on tele. Went over discharge instructions, verbalized understanding   No

## 2024-09-11 VITALS
SYSTOLIC BLOOD PRESSURE: 133 MMHG | HEART RATE: 65 BPM | DIASTOLIC BLOOD PRESSURE: 73 MMHG | OXYGEN SATURATION: 98 % | TEMPERATURE: 98 F | RESPIRATION RATE: 16 BRPM

## 2024-09-11 PROCEDURE — 71275 CT ANGIOGRAPHY CHEST: CPT | Mod: 26,MC

## 2024-09-11 RX ORDER — MECLIZINE HYDROCHLORIDE 25 MG/1
25 TABLET ORAL ONCE
Refills: 0 | Status: COMPLETED | OUTPATIENT
Start: 2024-09-11 | End: 2024-09-11

## 2024-09-11 RX ORDER — METOCLOPRAMIDE HCL 5 MG
10 TABLET ORAL ONCE
Refills: 0 | Status: COMPLETED | OUTPATIENT
Start: 2024-09-11 | End: 2024-09-11

## 2024-09-11 RX ADMIN — Medication 10 MILLIGRAM(S): at 05:11

## 2024-09-11 RX ADMIN — MECLIZINE HYDROCHLORIDE 25 MILLIGRAM(S): 25 TABLET ORAL at 05:11

## 2024-09-11 NOTE — ED ADULT NURSE REASSESSMENT NOTE - NS ED NURSE REASSESS COMMENT FT1
Patient AOx4, c/o nausea and dizziness; MD Angela made aware. Will medicate patient per chart. Comfort measures maintained. Bed in lowest position. Safety maintained.

## 2024-09-25 NOTE — ED ADULT TRIAGE NOTE - HEART RATE (BEATS/MIN)
[Nutrition/ Exercise/ Weight Management] : nutrition, exercise, weight management [Breast Self Exam] : breast self exam 92

## 2024-11-14 PROCEDURE — 90853 GROUP PSYCHOTHERAPY: CPT | Mod: 93

## 2025-01-15 ENCOUNTER — NON-APPOINTMENT (OUTPATIENT)
Age: 30
End: 2025-01-15

## 2025-02-07 ENCOUNTER — OUTPATIENT (OUTPATIENT)
Dept: OUTPATIENT SERVICES | Facility: HOSPITAL | Age: 30
LOS: 1 days | End: 2025-02-07
Payer: COMMERCIAL

## 2025-02-07 DIAGNOSIS — G47.33 OBSTRUCTIVE SLEEP APNEA (ADULT) (PEDIATRIC): ICD-10-CM

## 2025-02-07 PROCEDURE — 95810 POLYSOM 6/> YRS 4/> PARAM: CPT

## 2025-02-07 PROCEDURE — 95810 POLYSOM 6/> YRS 4/> PARAM: CPT | Mod: 26

## 2025-02-08 ENCOUNTER — OUTPATIENT (OUTPATIENT)
Dept: OUTPATIENT SERVICES | Facility: HOSPITAL | Age: 30
LOS: 1 days | End: 2025-02-08
Payer: COMMERCIAL

## 2025-02-08 DIAGNOSIS — G47.33 OBSTRUCTIVE SLEEP APNEA (ADULT) (PEDIATRIC): ICD-10-CM

## 2025-02-08 PROCEDURE — 95805 MULTIPLE SLEEP LATENCY TEST: CPT

## 2025-02-08 PROCEDURE — 95806 SLEEP STUDY UNATT&RESP EFFT: CPT | Mod: 26

## 2025-02-10 ENCOUNTER — TRANSCRIPTION ENCOUNTER (OUTPATIENT)
Age: 30
End: 2025-02-10

## 2025-06-24 NOTE — BH SAFETY PLAN - ASK FOR HELP PHONE 3
Patient is chronically on statin.will continue for now. Monitor clinically. Last LDL was   Lab Results   Component Value Date    LDLCALC 94 12/16/2020       (number in cell)

## 2025-08-06 PROCEDURE — 90833 PSYTX W PT W E/M 30 MIN: CPT

## 2025-08-06 PROCEDURE — 99214 OFFICE O/P EST MOD 30 MIN: CPT
